# Patient Record
Sex: FEMALE | Race: WHITE | NOT HISPANIC OR LATINO | Employment: FULL TIME | URBAN - NONMETROPOLITAN AREA
[De-identification: names, ages, dates, MRNs, and addresses within clinical notes are randomized per-mention and may not be internally consistent; named-entity substitution may affect disease eponyms.]

---

## 2017-10-10 ENCOUNTER — TRANSCRIBE ORDERS (OUTPATIENT)
Dept: ADMINISTRATIVE | Facility: HOSPITAL | Age: 47
End: 2017-10-10

## 2017-10-10 DIAGNOSIS — Z12.39 SCREENING BREAST EXAMINATION: ICD-10-CM

## 2017-10-10 DIAGNOSIS — Z12.31 ENCOUNTER FOR SCREENING MAMMOGRAM FOR MALIGNANT NEOPLASM OF BREAST: Primary | ICD-10-CM

## 2017-10-18 ENCOUNTER — HOSPITAL ENCOUNTER (OUTPATIENT)
Dept: MAMMOGRAPHY | Facility: HOSPITAL | Age: 47
Discharge: HOME | End: 2017-10-18
Admitting: NURSE PRACTITIONER

## 2017-10-18 DIAGNOSIS — Z12.31 ENCOUNTER FOR SCREENING MAMMOGRAM FOR MALIGNANT NEOPLASM OF BREAST: ICD-10-CM

## 2017-10-18 PROCEDURE — 77063 BREAST TOMOSYNTHESIS BI: CPT

## 2017-10-18 PROCEDURE — G0202 SCR MAMMO BI INCL CAD: HCPCS

## 2019-01-11 ENCOUNTER — TELEPHONE (OUTPATIENT)
Dept: BARIATRICS/WEIGHT MGMT | Facility: CLINIC | Age: 49
End: 2019-01-11

## 2019-01-11 NOTE — TELEPHONE ENCOUNTER
Patient has not been seen by Dr. Avina. She had her Lap-Band operative report faxed over to us yesterday 01/10/2019. She said that she has had food stuck in the pouch since 01/09/2018 at 6 PM and called to see if Dr. Avina has reviewed her report yet so that an appointment could be made. I told her that he has not and that it would be at least Monday before he did. She asked for recommendations on how to remove the food and that she is diabetic. I informed her that since she has never been seen, we cannot give her any recommendations. I did tell her though to contact her PCP or go to the ER in the meantime. Patient voiced understanding.

## 2019-01-14 ENCOUNTER — HOSPITAL ENCOUNTER (OUTPATIENT)
Dept: GENERAL RADIOLOGY | Facility: HOSPITAL | Age: 49
Discharge: HOME | End: 2019-01-14
Attending: SURGERY | Admitting: SURGERY

## 2019-01-14 ENCOUNTER — OFFICE VISIT (OUTPATIENT)
Dept: BARIATRICS/WEIGHT MGMT | Facility: CLINIC | Age: 49
End: 2019-01-14

## 2019-01-14 VITALS
WEIGHT: 226 LBS | HEART RATE: 90 BPM | TEMPERATURE: 98.3 F | BODY MASS INDEX: 40.04 KG/M2 | OXYGEN SATURATION: 98 % | SYSTOLIC BLOOD PRESSURE: 141 MMHG | DIASTOLIC BLOOD PRESSURE: 97 MMHG | HEIGHT: 63 IN

## 2019-01-14 DIAGNOSIS — E66.01 OBESITY, CLASS III, BMI 40-49.9 (MORBID OBESITY) (HCC): ICD-10-CM

## 2019-01-14 DIAGNOSIS — Z98.84 H/O LAPAROSCOPIC ADJUSTABLE GASTRIC BANDING: ICD-10-CM

## 2019-01-14 DIAGNOSIS — R13.19 OTHER DYSPHAGIA: Primary | ICD-10-CM

## 2019-01-14 PROCEDURE — S2083 ADJUSTMENT GASTRIC BAND: HCPCS | Performed by: SURGERY

## 2019-01-14 PROCEDURE — 74220 X-RAY XM ESOPHAGUS 1CNTRST: CPT

## 2019-01-14 PROCEDURE — 99203 OFFICE O/P NEW LOW 30 MIN: CPT | Performed by: SURGERY

## 2019-01-14 RX ORDER — ALPRAZOLAM 0.5 MG/1
TABLET ORAL DAILY
Refills: 2 | COMMUNITY
Start: 2019-01-08

## 2019-01-14 RX ORDER — DULOXETIN HYDROCHLORIDE 60 MG/1
60 CAPSULE, DELAYED RELEASE ORAL DAILY
Refills: 5 | COMMUNITY
Start: 2019-01-10

## 2019-01-14 RX ORDER — MULTIVIT-MIN/IRON/FOLIC ACID/K 18-600-40
CAPSULE ORAL WEEKLY
COMMUNITY

## 2019-01-14 RX ORDER — LEVOTHYROXINE SODIUM 0.05 MG/1
50 TABLET ORAL DAILY
Refills: 5 | COMMUNITY
Start: 2019-01-10

## 2019-01-14 RX ORDER — INSULIN DEGLUDEC 200 U/ML
50 INJECTION, SOLUTION SUBCUTANEOUS DAILY
COMMUNITY
Start: 2019-01-14

## 2019-01-14 RX ADMIN — BARIUM SULFATE 150 ML: 960 POWDER, FOR SUSPENSION ORAL at 15:05

## 2019-01-14 NOTE — PROGRESS NOTES
Patient Care Team:  Shubham Ballesteros MD as PCP - General (Family Medicine)    Reason for Visit: Hx. of Lap Gastric Band     Subjective     Patient is a 48 y.o. female presents with morbid obesity and her Body mass index is 40.68 kg/m².     She stated on this past Wednesday she ate roast beef and since then she has had difficulty swallowing foods.  She stated she is able to tolerate liquids however anything denser than puréed foods she had difficulty swallowing or regurgitated.  She stated she has been with the disease of obesity for year(s).  She stated she suffers from diabetes and joint pain due to her weight gain.  She stated that weight loss helps alleviate these symptoms.  With regards to her dysphagia there are no alleviating factors with regards to her sensation.  She stated that she had this problem in the past which was resolved by removing fluid from her band.  She stated has not had an adjustment for over 5 years nor has she been with a program since her previous program had shut down in [place].      Review of Systems  General ROS: negative  Respiratory ROS: no cough, shortness of breath, or wheezing  Cardiovascular ROS: no chest pain or dyspnea on exertion  Gastrointestinal ROS: no abdominal pain, change in bowel habits, or black or bloody stools    History  Past Medical History:   Diagnosis Date   • Anxiety    • Diabetes mellitus (CMS/HCC)    • Fibromyalgia    • Hypothyroidism     Dr Ballesteros    • Joint pain    • Migraines      Past Surgical History:   Procedure Laterality Date   • BREAST BIOPSY      Dr Anne    • CHOLECYSTECTOMY      Lap Dr Modi    • LAPAROSCOPIC GASTRIC BANDING  03/26/2010    Dr Rushing at [facility]      Family History   Problem Relation Age of Onset   • Arthritis Mother    • Dementia Mother    • Cancer Father         Skin    • No Known Problems Sister    • No Known Problems Brother    • No Known Problems Daughter    • No Known Problems Son    • No Known  Problems Maternal Grandmother    • No Known Problems Paternal Grandmother    • No Known Problems Maternal Aunt    • No Known Problems Paternal Aunt    • BRCA 1/2 Neg Hx    • Breast cancer Neg Hx    • Colon cancer Neg Hx    • Endometrial cancer Neg Hx    • Ovarian cancer Neg Hx      Social History     Tobacco Use   • Smoking status: Never Smoker   Substance Use Topics   • Alcohol use: Yes     Comment: rarely 1-2 times monthly   • Drug use: No       (Not in a hospital admission)  Allergies:  Diflucan [fluconazole]      Current Outpatient Medications:   •  ALPRAZolam (XANAX) 0.5 MG tablet, Take  by mouth Daily., Disp: , Rfl: 2  •  Cholecalciferol (VITAMIN D) 2000 units capsule, Take  by mouth 1 (One) Time Per Week., Disp: , Rfl:   •  DULoxetine (CYMBALTA) 60 MG capsule, Take 60 mg by mouth Daily., Disp: , Rfl: 5  •  KRILL OIL PO, Take  by mouth Daily., Disp: , Rfl:   •  levothyroxine (SYNTHROID, LEVOTHROID) 50 MCG tablet, Take 50 mcg by mouth Daily., Disp: , Rfl: 5  •  metFORMIN (GLUCOPHAGE) 1000 MG tablet, Take 2,000 mg by mouth Every Night., Disp: , Rfl: 5  •  NON FORMULARY, Lemon Bark 750mg two tablets at night, Disp: , Rfl:   •  TRESIBA FLEXTOUCH 200 UNIT/ML solution pen-injector, 50 Units Daily., Disp: , Rfl:     Objective     Vital Signs  Temp:  [98.3 °F (36.8 °C)] 98.3 °F (36.8 °C)  Heart Rate:  [90] 90  BP: (141)/(97) 141/97  Body mass index is 40.68 kg/m².      01/14/19  1331   Weight: 103 kg (226 lb)       Physical Exam:      HEENT: extra ocular movement intact  Respiratory: appears well, vitals normal, no respiratory distress, acyanotic, normal RR  GI: Abnormal shape: obese  Masses: Location- LUQ not tender 4 cm  Tenderness: absent  Musculoskeletal: inspection - no abnormality  Neurologic: alert, oriented, normal speech, no focal findings or movement disorder noted       Results Review:   Patient was ordered an esophagogram to assess the position of her band and patency of her band        Assessment/Plan    Encounter Diagnoses   Name Primary?   • Other dysphagia Yes   • H/O laparoscopic adjustable gastric banding    • Obesity, Class III, BMI 40-49.9 (morbid obesity) (CMS/[facility])        A total of 30 minutes was spent face-to-face with this patient during this encounter and over half the time was spent on counseling and coordination of care of her disease of obesity and recent onset of dysphagia.  With regards to her dysphagia I spoke with the patient and explained the adjustment process.  I explained the alternatives, benefits, complications and risks associated with adjusting her band which included the risk of infection, injuring the port, bleeding or leakage.  Verbal consent was obtained and I proceeded with accessing her port in standard fashion.  The skin was prepped with alcohol and a Marek needle was used to access her port.  A total of 4 mL was removed from her band without difficulty.  The patient subsequently drank 8 ounces of water without difficulty.  I followed this with a quarter of an esophagogram to assess the position of her band.  Of note I continued with educating the patient about appropriate behavior modifications to help assist the patient with continued weight loss even after removal of her fluid from her band.  The patient is to follow-up with our program in 4 weeks or as needed.  I discussed the patient's findings and my recommendations with patient.     Dr. Justin Avina MD FACS    01/14/19  2:01 PM  Patient Care Team:  Shubham Ballesteros MD as PCP - General (Family Medicine)

## 2019-01-15 ENCOUNTER — TELEPHONE (OUTPATIENT)
Dept: BARIATRICS/WEIGHT MGMT | Facility: CLINIC | Age: 49
End: 2019-01-15

## 2019-01-23 ENCOUNTER — HOSPITAL ENCOUNTER (OUTPATIENT)
Dept: WOMENS IMAGING | Age: 49
Discharge: HOME | End: 2019-01-23
Payer: COMMERCIAL

## 2019-01-23 DIAGNOSIS — Z12.31 ENCOUNTER FOR SCREENING MAMMOGRAM FOR MALIGNANT NEOPLASM OF BREAST: ICD-10-CM

## 2019-01-23 PROCEDURE — 77063 BREAST TOMOSYNTHESIS BI: CPT

## 2019-05-14 ENCOUNTER — TELEPHONE (OUTPATIENT)
Dept: BARIATRICS/WEIGHT MGMT | Facility: CLINIC | Age: 49
End: 2019-05-14

## 2019-05-14 NOTE — TELEPHONE ENCOUNTER
Called to reschedule patient's appointment that was cancelled in 03/2019. Patient said she does not wish to reschedule.

## 2019-10-09 ENCOUNTER — OFFICE VISIT (OUTPATIENT)
Dept: URGENT CARE | Age: 49
End: 2019-10-09
Payer: COMMERCIAL

## 2019-10-09 VITALS
RESPIRATION RATE: 16 BRPM | SYSTOLIC BLOOD PRESSURE: 138 MMHG | BODY MASS INDEX: 34.99 KG/M2 | HEART RATE: 112 BPM | DIASTOLIC BLOOD PRESSURE: 84 MMHG | WEIGHT: 210 LBS | OXYGEN SATURATION: 98 % | TEMPERATURE: 97.8 F | HEIGHT: 65 IN

## 2019-10-09 DIAGNOSIS — R11.0 NAUSEA: ICD-10-CM

## 2019-10-09 DIAGNOSIS — R74.8 ELEVATED SERUM ALKALINE PHOSPHATASE LEVEL: ICD-10-CM

## 2019-10-09 DIAGNOSIS — R51.9 ACUTE NONINTRACTABLE HEADACHE, UNSPECIFIED HEADACHE TYPE: Primary | ICD-10-CM

## 2019-10-09 DIAGNOSIS — B37.2 CANDIDIASIS OF SKIN: ICD-10-CM

## 2019-10-09 LAB
ALBUMIN SERPL-MCNC: 4.7 G/DL (ref 3.5–5.2)
ALP BLD-CCNC: 172 U/L (ref 35–104)
ALT SERPL-CCNC: 14 U/L (ref 5–33)
ANION GAP SERPL CALCULATED.3IONS-SCNC: 19 MMOL/L (ref 7–19)
AST SERPL-CCNC: 12 U/L (ref 5–32)
BASOPHILS ABSOLUTE: 0.1 K/UL (ref 0–0.2)
BASOPHILS RELATIVE PERCENT: 1 % (ref 0–1)
BILIRUB SERPL-MCNC: 0.4 MG/DL (ref 0.2–1.2)
BUN BLDV-MCNC: 9 MG/DL (ref 6–20)
CALCIUM SERPL-MCNC: 10 MG/DL (ref 8.6–10)
CHLORIDE BLD-SCNC: 101 MMOL/L (ref 98–111)
CO2: 17 MMOL/L (ref 22–29)
CREAT SERPL-MCNC: 0.8 MG/DL (ref 0.5–0.9)
EOSINOPHILS ABSOLUTE: 0.7 K/UL (ref 0–0.6)
EOSINOPHILS RELATIVE PERCENT: 6.9 % (ref 0–5)
GFR NON-AFRICAN AMERICAN: >60
GLUCOSE BLD-MCNC: 261 MG/DL (ref 74–109)
HCT VFR BLD CALC: 52.1 % (ref 37–47)
HEMOGLOBIN: 17.5 G/DL (ref 12–16)
IMMATURE GRANULOCYTES #: 0 K/UL
LYMPHOCYTES ABSOLUTE: 2.9 K/UL (ref 1.1–4.5)
LYMPHOCYTES RELATIVE PERCENT: 29.3 % (ref 20–40)
MCH RBC QN AUTO: 27.3 PG (ref 27–31)
MCHC RBC AUTO-ENTMCNC: 33.6 G/DL (ref 33–37)
MCV RBC AUTO: 81.2 FL (ref 81–99)
MONOCYTES ABSOLUTE: 0.7 K/UL (ref 0–0.9)
MONOCYTES RELATIVE PERCENT: 7.5 % (ref 0–10)
NEUTROPHILS ABSOLUTE: 5.5 K/UL (ref 1.5–7.5)
NEUTROPHILS RELATIVE PERCENT: 55 % (ref 50–65)
PDW BLD-RTO: 17 % (ref 11.5–14.5)
PLATELET # BLD: 347 K/UL (ref 130–400)
PMV BLD AUTO: 9.8 FL (ref 9.4–12.3)
POTASSIUM SERPL-SCNC: 4 MMOL/L (ref 3.5–5)
RBC # BLD: 6.42 M/UL (ref 4.2–5.4)
SODIUM BLD-SCNC: 137 MMOL/L (ref 136–145)
TOTAL PROTEIN: 7.2 G/DL (ref 6.6–8.7)
WBC # BLD: 9.9 K/UL (ref 4.8–10.8)

## 2019-10-09 PROCEDURE — 36415 COLL VENOUS BLD VENIPUNCTURE: CPT | Performed by: NURSE PRACTITIONER

## 2019-10-09 PROCEDURE — 99203 OFFICE O/P NEW LOW 30 MIN: CPT | Performed by: NURSE PRACTITIONER

## 2019-10-09 RX ORDER — NYSTATIN 100000 [USP'U]/G
POWDER TOPICAL
Qty: 1 BOTTLE | Refills: 0 | Status: SHIPPED | OUTPATIENT
Start: 2019-10-09 | End: 2021-08-05

## 2019-10-09 ASSESSMENT — ENCOUNTER SYMPTOMS
SORE THROAT: 0
SHORTNESS OF BREATH: 0
SINUS PAIN: 0
BACK PAIN: 0
DIARRHEA: 0
TROUBLE SWALLOWING: 0
COUGH: 0
ABDOMINAL PAIN: 1
EYES NEGATIVE: 1
NAUSEA: 1
SINUS PRESSURE: 0
VOMITING: 0
ALLERGIC/IMMUNOLOGIC NEGATIVE: 1
CONSTIPATION: 1

## 2020-07-27 ENCOUNTER — OFFICE VISIT (OUTPATIENT)
Dept: OBGYN | Age: 50
End: 2020-07-27
Payer: COMMERCIAL

## 2020-07-27 VITALS
DIASTOLIC BLOOD PRESSURE: 94 MMHG | WEIGHT: 194 LBS | HEIGHT: 65 IN | HEART RATE: 84 BPM | SYSTOLIC BLOOD PRESSURE: 127 MMHG | BODY MASS INDEX: 32.32 KG/M2

## 2020-07-27 PROCEDURE — 99203 OFFICE O/P NEW LOW 30 MIN: CPT | Performed by: NURSE PRACTITIONER

## 2020-07-27 RX ORDER — ATORVASTATIN CALCIUM 10 MG/1
10 TABLET, FILM COATED ORAL DAILY
COMMUNITY

## 2020-07-27 RX ORDER — LEVOTHYROXINE SODIUM 0.05 MG/1
50 TABLET ORAL DAILY
COMMUNITY

## 2020-07-27 ASSESSMENT — ENCOUNTER SYMPTOMS
ALLERGIC/IMMUNOLOGIC NEGATIVE: 1
BLOOD IN STOOL: 0
RESPIRATORY NEGATIVE: 1
CONSTIPATION: 0
DIARRHEA: 0
ABDOMINAL DISTENTION: 1
EYES NEGATIVE: 1

## 2020-07-27 NOTE — PROGRESS NOTES
Gustavo Alvarez is a 44 y.o. female who presents today for her medical conditions/ complaints as noted below. Gustavo Alvarez is c/o of Establish Care and Pelvic Pain        HPI  New pt presents with new onset pelvic pain and yeast infection for about a month. Was seen by her PCP and treated for yeast- it did not improve. Had transabdominal u/s showing normal findings. Pt has not had a period in 5 years. Had uterine ablation. Still having some hot flashes. Pt has type 2 DM. Takes Jardiance and Metformin. Gets frequent yeast infections. Also having more gas, bloating, foul smelling stools. Has never had colon cancer screening. Patient's last menstrual period was 10/31/2014. No obstetric history on file. Past Medical History:   Diagnosis Date    Anxiety     Fibromyalgia     Hyperlipidemia     Thyroid disease     Type II or unspecified type diabetes mellitus without mention of complication, not stated as uncontrolled      Past Surgical History:   Procedure Laterality Date    BREAST BIOPSY  4/19/2012    stereotactic right, fat necrosis and foci of fibrosis    CHOLECYSTECTOMY      DILATION AND CURETTAGE OF UTERUS      LAP BAND  2009    OTHER SURGICAL HISTORY      uterine ablation     Family History   Problem Relation Age of Onset    Heart Disease Father      Social History     Tobacco Use    Smoking status: Never Smoker    Smokeless tobacco: Never Used   Substance Use Topics    Alcohol use: Yes       Current Outpatient Medications   Medication Sig Dispense Refill    levothyroxine (SYNTHROID) 50 MCG tablet Take 50 mcg by mouth Daily      atorvastatin (LIPITOR) 10 MG tablet Take 10 mg by mouth daily      nystatin-triamcinolone (MYCOLOG II) 944160-0.1 UNIT/GM-% cream Apply topically 2 times daily.  1 Tube 1    empagliflozin (JARDIANCE) 25 MG tablet Take 25 mg by mouth daily      Cholecalciferol (VITAMIN D PO) Take by mouth      nystatin (MYCOSTATIN) 950302 UNIT/GM powder Apply small amount under breasts 3 times daily for 10 days 1 Bottle 0    CRESTOR 20 MG tablet 20 mg.      Eszopiclone (LUNESTA PO) Take  by mouth.  duloxetine (CYMBALTA) 60 MG capsule Take 60 mg by mouth daily.  metformin (GLUCOPHAGE) 1000 MG tablet Take 1,000 mg by mouth 2 times daily (with meals).  alprazolam (XANAX) 0.5 MG tablet Take 0.5 mg by mouth nightly as needed.  cyclobenzaprine (FLEXERIL) 10 MG tablet Take 10 mg by mouth 3 times daily as needed. No current facility-administered medications for this visit. Allergies   Allergen Reactions    Diflucan [Fluconazole]      Vitals:    07/27/20 0856   BP: (!) 127/94   Pulse: 84     Body mass index is 32.28 kg/m². Review of Systems   Constitutional: Negative. HENT: Negative. Eyes: Negative. Respiratory: Negative. Cardiovascular: Negative. Gastrointestinal: Positive for abdominal distention. Negative for blood in stool, constipation and diarrhea. Endocrine: Negative. Genitourinary: Positive for pelvic pain and vaginal pain (burning). Negative for frequency, menstrual problem and urgency. Musculoskeletal: Negative. Skin: Negative. Allergic/Immunologic: Negative. Neurological: Negative. Hematological: Negative. Psychiatric/Behavioral: Negative. All other systems reviewed and are negative. Physical Exam  Vitals signs and nursing note reviewed. Constitutional:       Appearance: She is well-developed. HENT:      Head: Normocephalic. Right Ear: External ear normal.      Left Ear: External ear normal.      Nose: Nose normal.   Neck:      Musculoskeletal: Normal range of motion. Genitourinary:     Labia:         Right: Tenderness present. Left: Tenderness present. Vagina: Erythema and tenderness present. No vaginal discharge or bleeding. Cervix: No cervical motion tenderness or discharge. Uterus: Tender. Adnexa:         Right: Tenderness and fullness present.  No mass.          Left: Tenderness and fullness present. No mass. Comments: Erythematic vulva without lesions  Musculoskeletal: Normal range of motion. Skin:     General: Skin is warm and dry. Neurological:      Mental Status: She is alert and oriented to person, place, and time. Psychiatric:         Attention and Perception: Attention normal.         Mood and Affect: Mood normal.         Speech: Speech normal.         Behavior: Behavior normal.         Thought Content: Thought content normal.         Cognition and Memory: Cognition normal.         Judgment: Judgment normal.          Diagnosis Orders   1. Encounter to establish care     2. Pelvic pain     3. Vulvar itching     4. Screen for colon cancer  Stacia Teran MD, Gastroenterology, [facility]   5. Bloating         MEDICATIONS:  Orders Placed This Encounter   Medications    DISCONTD: nystatin-triamcinolone (MYCOLOG II) 867718-6.9 UNIT/GM-% cream     Sig: Apply topically 2 times daily. Dispense:  1 Tube     Refill:  1    nystatin-triamcinolone (MYCOLOG II) 997818-5.0 UNIT/GM-% cream     Sig: Apply topically 2 times daily. Dispense:  1 Tube     Refill:  1       ORDERS:  Orders Placed This Encounter   Procedures   Stacia Teran MD, Gastroenterology, [place]       PLAN:  Diatherix collected  Treatment given for external yeast on exam  Referral made to GI    Patient Instructions   Patient Education        Pelvic Pain: Care Instructions  Your Care Instructions     Pelvic pain, or pain in the lower belly, can have many causes. Often pelvic pain is not serious and gets better in a few days. If your pain continues or gets worse, you may need tests and treatment. Tell your doctor about any new symptoms. These may be signs of a serious problem. Follow-up care is a key part of your treatment and safety. Be sure to make and go to all appointments, and call your doctor if you are having problems.  It's also a good idea to know your test results and keep a list of the medicines you take. How can you care for yourself at home? · Rest until you feel better. Lie down, and raise your legs by placing a pillow under your knees. · Drink plenty of fluids. You may find that small, frequent sips are easier on your stomach than if you drink a lot at once. Avoid drinks with carbonation or caffeine, such as soda pop, tea, or coffee. · Try eating several small meals instead of 2 or 3 large ones. Eat mild foods, such as rice, dry toast or crackers, bananas, and applesauce. Avoid fatty and spicy foods, other fruits, and alcohol until 48 hours after your symptoms have gone away. · Take an over-the-counter pain medicine, such as acetaminophen (Tylenol), ibuprofen (Advil, Motrin), or naproxen (Aleve). Read and follow all instructions on the label. · Do not take two or more pain medicines at the same time unless the doctor told you to. Many pain medicines have acetaminophen, which is Tylenol. Too much acetaminophen (Tylenol) can be harmful. · You can put a heating pad, a warm cloth, or moist heat on your belly to relieve pain. When should you call for help? Call your doctor now or seek immediate medical care if:  · You have a new or higher fever. · You have unusual vaginal bleeding. · You have new or worse belly or pelvic pain. · You have vaginal discharge that has increased in amount or smells bad. Watch closely for changes in your health, and be sure to contact your doctor if:  · You do not get better as expected. Where can you learn more? Go to https://[employer] Glass.vBrand. org and sign in to your [employer] account. Enter [phone] in the [employer] box to learn more about \"Pelvic Pain: Care Instructions. \"     If you do not have an account, please click on the \"Sign Up Now\" link. Current as of: November 8, 2019               Content Version: 12.5  © 0820-8340 Healthwise, Incorporated.    Care instructions adapted under license by [facility] [place] [place] [address] [facility] If you have questions about a medical condition or this instruction, always ask your healthcare professional. [place] [address] any warranty or liability for your use of this information. Patient Education        Vaginal Yeast Infection: Care Instructions  Your Care Instructions     A vaginal yeast infection is caused by too many yeast cells in the vagina. This is common in women of all ages. Itching, vaginal discharge and irritation, and other symptoms can bother you. But yeast infections don't often cause other health problems. Some medicines can increase your risk of getting a yeast infection. These include antibiotics, birth control pills, hormones, and steroids. You may also be more likely to get a yeast infection if you are pregnant, have diabetes, douche, or wear tight clothes. With treatment, most yeast infections get better in 2 to 3 days. Follow-up care is a key part of your treatment and safety. Be sure to make and go to all appointments, and call your doctor if you are having problems. It's also a good idea to know your test results and keep a list of the medicines you take. How can you care for yourself at home? · Take your medicines exactly as prescribed. Call your doctor if you think you are having a problem with your medicine. · Ask your doctor about over-the-counter (OTC) medicines for yeast infections. They may cost less than prescription medicines. If you use an OTC treatment, read and follow all instructions on the label. · Do not use tampons while using a vaginal cream or suppository. The tampons can absorb the medicine. Use pads instead. · Wear loose cotton clothing. Do not wear nylon or other fabric that holds body heat and moisture close to the skin. · Try sleeping without underwear. · Do not scratch. Relieve itching with a cold pack or a cool bath. · Do not wash your vaginal area more than once a day.  Use plain water or a mild, unscented soap. Air-dry the vaginal area. · Change out of wet swimsuits after swimming. · Do not have sex until you have finished your treatment. · Do not douche. When should you call for help? Call your doctor now or seek immediate medical care if:  · You have unexpected vaginal bleeding. · You have new or increased pain in your vagina or pelvis. Watch closely for changes in your health, and be sure to contact your doctor if:  · You have a fever. · You are not getting better after 2 days. · Your symptoms come back after you finish your medicines. Where can you learn more? Go to https://Focus Financial Partnerspepiceweb.healthTORCH.sh. org and sign in to your [employer] account. Enter Y215 in the Ferfics box to learn more about \"Vaginal Yeast Infection: Care Instructions. \"     If you do not have an account, please click on the \"Sign Up Now\" link. Current as of: November 8, 2019               Content Version: 12.5  © 5628-7841 Healthwise, Incorporated. Care instructions adapted under license by [facility] ([facility]). If you have questions about a medical condition or this instruction, always ask your healthcare professional. [address] any warranty or liability for your use of this information.

## 2020-07-27 NOTE — PROGRESS NOTES
Pt is referred for pelvic pain. She saw her PCP on 7/9 for her annual pap smear and breast exam. Was c/o the pelvic pain and was treated for yeast infection. She states the pain is intermittent and it started in the beginning of this month. She did have a pelvic u/s. She is also having painful intercourse and bleeding with intercourse since the beginning of this month as well.

## 2020-07-27 NOTE — PATIENT INSTRUCTIONS
Patient Education        Pelvic Pain: Care Instructions  Your Care Instructions     Pelvic pain, or pain in the lower belly, can have many causes. Often pelvic pain is not serious and gets better in a few days. If your pain continues or gets worse, you may need tests and treatment. Tell your doctor about any new symptoms. These may be signs of a serious problem. Follow-up care is a key part of your treatment and safety. Be sure to make and go to all appointments, and call your doctor if you are having problems. It's also a good idea to know your test results and keep a list of the medicines you take. How can you care for yourself at home? · Rest until you feel better. Lie down, and raise your legs by placing a pillow under your knees. · Drink plenty of fluids. You may find that small, frequent sips are easier on your stomach than if you drink a lot at once. Avoid drinks with carbonation or caffeine, such as soda pop, tea, or coffee. · Try eating several small meals instead of 2 or 3 large ones. Eat mild foods, such as rice, dry toast or crackers, bananas, and applesauce. Avoid fatty and spicy foods, other fruits, and alcohol until 48 hours after your symptoms have gone away. · Take an over-the-counter pain medicine, such as acetaminophen (Tylenol), ibuprofen (Advil, Motrin), or naproxen (Aleve). Read and follow all instructions on the label. · Do not take two or more pain medicines at the same time unless the doctor told you to. Many pain medicines have acetaminophen, which is Tylenol. Too much acetaminophen (Tylenol) can be harmful. · You can put a heating pad, a warm cloth, or moist heat on your belly to relieve pain. When should you call for help? Call your doctor now or seek immediate medical care if:  · You have a new or higher fever. · You have unusual vaginal bleeding. · You have new or worse belly or pelvic pain. · You have vaginal discharge that has increased in amount or smells bad.   Watch closely for changes in your health, and be sure to contact your doctor if:  · You do not get better as expected. Where can you learn more? Go to https://chpepiceweb.healthAdvizzer. org and sign in to your LifeOnKey account. Enter [phone] in the [facility] box to learn more about \"Pelvic Pain: Care Instructions. \"     If you do not have an account, please click on the \"Sign Up Now\" link. Current as of: November 8, 2019               Content Version: 12.5  © 8446-7082 [employer]. Care instructions adapted under license by [facility] ([facility]). If you have questions about a medical condition or this instruction, always ask your healthcare professional. [address] any warranty or liability for your use of this information. Patient Education        Vaginal Yeast Infection: Care Instructions  Your Care Instructions     A vaginal yeast infection is caused by too many yeast cells in the vagina. This is common in women of all ages. Itching, vaginal discharge and irritation, and other symptoms can bother you. But yeast infections don't often cause other health problems. Some medicines can increase your risk of getting a yeast infection. These include antibiotics, birth control pills, hormones, and steroids. You may also be more likely to get a yeast infection if you are pregnant, have diabetes, douche, or wear tight clothes. With treatment, most yeast infections get better in 2 to 3 days. Follow-up care is a key part of your treatment and safety. Be sure to make and go to all appointments, and call your doctor if you are having problems. It's also a good idea to know your test results and keep a list of the medicines you take. How can you care for yourself at home? · Take your medicines exactly as prescribed. Call your doctor if you think you are having a problem with your medicine. · Ask your doctor about over-the-counter (OTC) medicines for yeast infections.  They may

## 2020-07-29 ENCOUNTER — OFFICE VISIT (OUTPATIENT)
Dept: GASTROENTEROLOGY | Age: 50
End: 2020-07-29
Payer: COMMERCIAL

## 2020-07-29 VITALS
OXYGEN SATURATION: 99 % | HEIGHT: 65 IN | HEART RATE: 89 BPM | SYSTOLIC BLOOD PRESSURE: 130 MMHG | BODY MASS INDEX: 31.65 KG/M2 | WEIGHT: 190 LBS | DIASTOLIC BLOOD PRESSURE: 80 MMHG

## 2020-07-29 PROCEDURE — 99214 OFFICE O/P EST MOD 30 MIN: CPT | Performed by: NURSE PRACTITIONER

## 2020-07-29 ASSESSMENT — ENCOUNTER SYMPTOMS
DIARRHEA: 0
ABDOMINAL PAIN: 1
BLOOD IN STOOL: 0
CHOKING: 0
CONSTIPATION: 1
COUGH: 0
NAUSEA: 0
TROUBLE SWALLOWING: 0
ANAL BLEEDING: 0
ABDOMINAL DISTENTION: 1
VOMITING: 0
SHORTNESS OF BREATH: 0
RECTAL PAIN: 0

## 2020-07-29 NOTE — PROGRESS NOTES
Subjective:     Patient ID: Adam Doll is a 44 y.o. female  PCP: Marlon Vila  Referring Provider: NAYELY Ramirez CNP    HPI  Patient presents to the office today with the following complaints: New Patient (ref by Lily Cabello); Abdominal Pain; Colonoscopy; and Bloated    Abdominal Pain  Patient complains of abdominal pain. The pain is located in the lower abdomen. The pain is described as sharp. Onset was one month ago. Aggravating factors include nothing. Alleviating factors include none. Associated symptoms include constipation. The patient denies diarrhea, hematochezia and melena. Pt reports chronic constipation. She is currently using Benefiber with little results. She has tried and failed Miralax. No previous history of colonoscopy  Denies any family history of colon cancer or colon polyps    This was my first time assessing Ms. Shankar    Assessment:     1. Lower abdominal pain    2. Constipation, unspecified constipation type    3. Bloating         Plan:   - Trial of Linzess, samples provided today  - Schedule colonoscopy  Instruct on bowel prep. Nothing to eat or drink after midnight the day of the exam.  Unable to drive for 24 hours after the procedure. No aspirin or nonsteroidal anti-inflammatories for 5 days before procedure. I have discussed the benefits, alternatives, and risks (including bleeding, perforation and death)  for pursuing Endoscopy (EGD/Colonscopy/EUS/ERCP) with the patient and they are willing to continue. We also discussed the need for anesthesia, IV access, proper dietary changes, medication changes if necessary, and need for bowel prep (if ordered) prior to their Endoscopic procedure. They are aware they must have someone accompany them to their scheduled procedure to drive them home - they agree to the above and are willing to continue. Orders  No orders of the defined types were placed in this encounter.     Medications  No orders of the defined types were placed in this encounter.         Patient History:     Past Medical History:   Diagnosis Date    Anxiety     Fibromyalgia     Hyperlipidemia     Thyroid disease     Type II or unspecified type diabetes mellitus without mention of complication, not stated as uncontrolled        Past Surgical History:   Procedure Laterality Date    BREAST BIOPSY  4/19/2012    stereotactic right, fat necrosis and foci of fibrosis    CHOLECYSTECTOMY      DILATION AND CURETTAGE OF UTERUS      LAP BAND  2009    OTHER SURGICAL HISTORY      uterine ablation       Family History   Problem Relation Age of Onset    Heart Disease Father     Colon Cancer Neg Hx     Colon Polyps Neg Hx        Social History     Socioeconomic History    Marital status:      Spouse name: None    Number of children: None    Years of education: None    Highest education level: None   Occupational History    None   Social Needs    Financial resource strain: None    Food insecurity     Worry: None     Inability: None    Transportation needs     Medical: None     Non-medical: None   Tobacco Use    Smoking status: Never Smoker    Smokeless tobacco: Never Used   Substance and Sexual Activity    Alcohol use: Not Currently     Comment: occ    Drug use: No    Sexual activity: None   Lifestyle    Physical activity     Days per week: None     Minutes per session: None    Stress: None   Relationships    Social connections     Talks on phone: None     Gets together: None     Attends Hindu service: None     Active member of club or organization: None     Attends meetings of clubs or organizations: None     Relationship status: None    Intimate partner violence     Fear of current or ex partner: None     Emotionally abused: None     Physically abused: None     Forced sexual activity: None   Other Topics Concern    None   Social History Narrative    None       Current Outpatient Medications   Medication Sig Dispense Refill    Left Ear: External ear normal.      Nose: Nose normal.      Comments: Mask in place     Mouth/Throat:      Comments: Mask in place    Eyes:      Conjunctiva/sclera: Conjunctivae normal.      Pupils: Pupils are equal, round, and reactive to light. Neck:      Musculoskeletal: Normal range of motion and neck supple. Cardiovascular:      Rate and Rhythm: Normal rate and regular rhythm. Heart sounds: Normal heart sounds. No murmur. No friction rub. No gallop. Pulmonary:      Effort: Pulmonary effort is normal. No respiratory distress. Breath sounds: Normal breath sounds. Abdominal:      General: Bowel sounds are normal. There is no distension. Palpations: Abdomen is soft. There is no mass. Tenderness: There is generalized abdominal tenderness. There is no guarding or rebound. Musculoskeletal: Normal range of motion. Skin:     General: Skin is warm and dry. Findings: No rash. Nails: There is no clubbing. Neurological:      Mental Status: She is alert and oriented to person, place, and time. Gait: Gait normal.   Psychiatric:         Behavior: Behavior normal.         Thought Content:  Thought content normal.

## 2020-07-30 ENCOUNTER — TELEPHONE (OUTPATIENT)
Dept: OBGYN | Age: 50
End: 2020-07-30

## 2020-07-30 NOTE — TELEPHONE ENCOUNTER
Please let her know her vaginal cultures are negative. Her pain may be more GI related, so will need to keep her referral. Thanks BRISEYDA    Pt informed and she has a c-scope set up.  Shelia/VIKKI

## 2020-07-31 ENCOUNTER — OFFICE VISIT (OUTPATIENT)
Age: 50
End: 2020-07-31

## 2020-07-31 VITALS — TEMPERATURE: 96.6 F | OXYGEN SATURATION: 97 % | HEART RATE: 83 BPM

## 2020-08-03 LAB — SARS-COV-2, NAA: NOT DETECTED

## 2020-08-04 ENCOUNTER — ANESTHESIA EVENT (OUTPATIENT)
Dept: OPERATING ROOM | Age: 50
End: 2020-08-04

## 2020-08-04 ENCOUNTER — HOSPITAL ENCOUNTER (OUTPATIENT)
Age: 50
Setting detail: OUTPATIENT SURGERY
Discharge: HOME | End: 2020-08-04
Attending: INTERNAL MEDICINE | Admitting: INTERNAL MEDICINE
Payer: COMMERCIAL

## 2020-08-04 ENCOUNTER — APPOINTMENT (OUTPATIENT)
Dept: OPERATING ROOM | Age: 50
End: 2020-08-04

## 2020-08-04 ENCOUNTER — ANESTHESIA (OUTPATIENT)
Dept: OPERATING ROOM | Age: 50
End: 2020-08-04

## 2020-08-04 VITALS
RESPIRATION RATE: 18 BRPM | DIASTOLIC BLOOD PRESSURE: 60 MMHG | TEMPERATURE: 97.7 F | HEART RATE: 89 BPM | BODY MASS INDEX: 31.32 KG/M2 | OXYGEN SATURATION: 98 % | WEIGHT: 188 LBS | HEIGHT: 65 IN | SYSTOLIC BLOOD PRESSURE: 102 MMHG

## 2020-08-04 VITALS — OXYGEN SATURATION: 98 % | DIASTOLIC BLOOD PRESSURE: 64 MMHG | SYSTOLIC BLOOD PRESSURE: 101 MMHG

## 2020-08-04 PROCEDURE — G8918 PT W/O PREOP ORDER IV AB PRO: HCPCS

## 2020-08-04 PROCEDURE — 45378 DIAGNOSTIC COLONOSCOPY: CPT | Performed by: INTERNAL MEDICINE

## 2020-08-04 PROCEDURE — 45378 DIAGNOSTIC COLONOSCOPY: CPT

## 2020-08-04 PROCEDURE — G8907 PT DOC NO EVENTS ON DISCHARG: HCPCS

## 2020-08-04 RX ORDER — ONDANSETRON 2 MG/ML
4 INJECTION INTRAMUSCULAR; INTRAVENOUS
Status: DISCONTINUED | OUTPATIENT
Start: 2020-08-04 | End: 2020-08-04 | Stop reason: HOSPADM

## 2020-08-04 RX ORDER — PROMETHAZINE HYDROCHLORIDE 25 MG/ML
6.25 INJECTION, SOLUTION INTRAMUSCULAR; INTRAVENOUS
Status: DISCONTINUED | OUTPATIENT
Start: 2020-08-04 | End: 2020-08-04 | Stop reason: HOSPADM

## 2020-08-04 RX ORDER — PROPOFOL 10 MG/ML
INJECTION, EMULSION INTRAVENOUS PRN
Status: DISCONTINUED | OUTPATIENT
Start: 2020-08-04 | End: 2020-08-04 | Stop reason: SDUPTHER

## 2020-08-04 RX ORDER — SODIUM CHLORIDE 9 MG/ML
INJECTION, SOLUTION INTRAVENOUS CONTINUOUS
Status: DISCONTINUED | OUTPATIENT
Start: 2020-08-04 | End: 2020-08-04 | Stop reason: HOSPADM

## 2020-08-04 RX ORDER — LIDOCAINE HYDROCHLORIDE 10 MG/ML
INJECTION, SOLUTION INFILTRATION; PERINEURAL PRN
Status: DISCONTINUED | OUTPATIENT
Start: 2020-08-04 | End: 2020-08-04 | Stop reason: SDUPTHER

## 2020-08-04 RX ORDER — DIPHENHYDRAMINE HYDROCHLORIDE 50 MG/ML
12.5 INJECTION INTRAMUSCULAR; INTRAVENOUS
Status: DISCONTINUED | OUTPATIENT
Start: 2020-08-04 | End: 2020-08-04 | Stop reason: HOSPADM

## 2020-08-04 RX ORDER — SODIUM CHLORIDE, SODIUM LACTATE, POTASSIUM CHLORIDE, CALCIUM CHLORIDE 600; 310; 30; 20 MG/100ML; MG/100ML; MG/100ML; MG/100ML
INJECTION, SOLUTION INTRAVENOUS CONTINUOUS PRN
Status: DISCONTINUED | OUTPATIENT
Start: 2020-08-04 | End: 2020-08-04 | Stop reason: SDUPTHER

## 2020-08-04 RX ADMIN — PROPOFOL 450 MG: 10 INJECTION, EMULSION INTRAVENOUS at 11:20

## 2020-08-04 RX ADMIN — LIDOCAINE HYDROCHLORIDE 5 ML: 10 INJECTION, SOLUTION INFILTRATION; PERINEURAL at 11:19

## 2020-08-04 RX ADMIN — SODIUM CHLORIDE: 9 INJECTION, SOLUTION INTRAVENOUS at 09:59

## 2020-08-04 RX ADMIN — SODIUM CHLORIDE, SODIUM LACTATE, POTASSIUM CHLORIDE, CALCIUM CHLORIDE: 600; 310; 30; 20 INJECTION, SOLUTION INTRAVENOUS at 11:11

## 2020-08-04 NOTE — H&P
Patient Name: Evalene Homans  : 1970  MRN: [id number]  DATE: 20    Allergies: Allergies   Allergen Reactions    Diflucan [Fluconazole] Hives        ENDOSCOPY  History and Physical    Procedure:    [x] Diagnostic Colonoscopy       [] Screening Colonoscopy  [] EGD      [] ERCP      [] EUS       [] Other    [x] Previous office notes/History and Physical reviewed from the patients chart. Please see EMR for further details of HPI. I have examined the patient's status immediately prior to the procedure and:      Indications/HPI:  1. Lower abdominal pain    2. Constipation, unspecified constipation type    3. Bloating        []Abdominal Pain   []Cancer- GI/Lung     []Fhx of colon CA/polyps  []History of Polyps  []Barretts            []Melena  []Abnormal Imaging              []Dysphagia              []Persistent Pneumonia   []Anemia                            []Food Impaction        []History of Polyps  [] GI Bleed             []Pulmonary nodule/Mass   []Change in bowel habits []Heartburn/Reflux  []Rectal Bleed (BRBPR)  []Chest Pain - Non Cardiac []Heme (+) Stool []Ulcers  []Constipation  []Hemoptysis  []Varices  []Diarrhea  []Hypoxemia    []Nausea/Vomiting   []Screening   []Crohns/Colitis  []Other:     Anesthesia:   [x] MAC [] Moderate Sedation   [] General   [] None     ROS: 12 pt Review of Symptoms was negative unless mentioned above    Medications:   Prior to Admission medications    Medication Sig Start Date End Date Taking? Authorizing Provider   linaCLOtide [facility]) 72 MCG CAPS capsule Take 1 capsule by mouth every morning (before breakfast) 20  Yes NAYELY Chapman - NP   levothyroxine (SYNTHROID) 50 MCG tablet Take 50 mcg by mouth Daily   Yes Historical Provider, MD   atorvastatin (LIPITOR) 10 MG tablet Take 10 mg by mouth daily   Yes Historical Provider, MD   nystatin-triamcinolone (MYCOLOG II) 765077-2.1 UNIT/GM-% cream Apply topically 2 times daily.  20  Yes NAYELY Carrasco - CNP   empagliflozin (JARDIANCE) 25 MG tablet Take 25 mg by mouth daily   Yes Historical Provider, MD   Cholecalciferol (VITAMIN D PO) Take by mouth   Yes Historical Provider, MD   nystatin (MYCOSTATIN) 443791 UNIT/GM powder Apply small amount under breasts 3 times daily for 10 days 10/9/19  Yes NAYELY Menendez - CNP   Eszopiclone (LUNESTA PO) Take  by mouth. Yes Historical Provider, MD   duloxetine (CYMBALTA) 60 MG capsule Take 60 mg by mouth daily. Yes Historical Provider, MD   cyclobenzaprine (FLEXERIL) 10 MG tablet Take 10 mg by mouth 3 times daily as needed. Yes Historical Provider, MD   metformin (GLUCOPHAGE) 1000 MG tablet Take 1,000 mg by mouth 2 times daily (with meals). Yes Historical Provider, MD   alprazolam Katiana Frost) 0.5 MG tablet Take 0.5 mg by mouth nightly as needed.      Yes Historical Provider, MD       Past Medical History:  Past Medical History:   Diagnosis Date    Anxiety     Fibromyalgia     Hyperlipidemia     Thyroid disease     Type II or unspecified type diabetes mellitus without mention of complication, not stated as uncontrolled        Past Surgical History:  Past Surgical History:   Procedure Laterality Date    BREAST BIOPSY  4/19/2012    stereotactic right, fat necrosis and foci of fibrosis    CHOLECYSTECTOMY      DILATION AND CURETTAGE OF UTERUS      LAP BAND  2009    OTHER SURGICAL HISTORY      uterine ablation       Social History:  Social History     Tobacco Use    Smoking status: Never Smoker    Smokeless tobacco: Never Used   Substance Use Topics    Alcohol use: Not Currently     Comment: occ    Drug use: No       Vital Signs:   Vitals:    08/04/20 0954   BP: 133/82   Pulse: 79   Resp: 18   Temp: 97.7 °F (36.5 °C)   SpO2: 100%        Physical Exam:  Cardiac:  [x]WNL  []Comments:  Pulmonary:  [x]WNL   []Comments:  Neuro/Mental Status:  [x]WNL  []Comments:  Abdominal:  [x]WNL    []Comments:  Other:   []WNL  []Comments:    Informed Consent:  The risks and benefits of the procedure have been discussed with either the patient or if they cannot consent, their representative. Assessment:  Patient examined and appropriate for planned sedation and procedure. Plan:  Proceed with planned sedation and procedure as above.          Jimmy Orellana MD

## 2020-08-04 NOTE — ANESTHESIA PRE PROCEDURE
Department of Anesthesiology  Preprocedure Note       Name:  Markos Hroton   Age:  44 y.o.  :  1970                                          MRN:  [id number]         Date:  2020      Surgeon: Naeem Escobar):  Jesús Roman MD    Procedure: Procedure(s):  COLONOSCOPY DIAGNOSTIC    Medications prior to admission:   Prior to Admission medications    Medication Sig Start Date End Date Taking? Authorizing Provider   [facility]inaCL[facility]ide [facility]) 72 MCG CAPS capsule Take 1 capsule by mouth every morning (before breakfast) 20  Yes Irwin Kang APRN - NP   levothyroxine (SYNTHROID) 50 MCG tablet Take 50 mcg by mouth Daily   Yes Historical Provider, MD   atorvastatin (LIPITOR) 10 MG tablet Take 10 mg by mouth daily   Yes Historical Provider, MD   nystatin-triamcinolone (MYCOLOG II) 131207-9.1 UNIT/GM-% cream Apply topically 2 times daily. 20  Yes NAYELY Engel - CNP   empagliflozin (JARDIANCE) 25 MG tablet Take 25 mg by mouth daily   Yes Historical Provider, MD   Cholecalciferol (VITAMIN D PO) Take by mouth   Yes Historical Provider, MD   nystatin (MYCOSTATIN) 111451 UNIT/GM powder Apply small amount under breasts 3 times daily for 10 days 10/9/19  Yes NAYELY Kan - CNP   Eszopiclone (LUNESTA PO) Take  by mouth. Yes Historical Provider, MD   duloxetine (CYMBALTA) 60 MG capsule Take 60 mg by mouth daily. Yes Historical Provider, MD   cyclobenzaprine (FLEXERIL) 10 MG tablet Take 10 mg by mouth 3 times daily as needed. Yes Historical Provider, MD   metformin (GLUCOPHAGE) 1000 MG tablet Take 1,000 mg by mouth 2 times daily (with meals). Yes Historical Provider, MD   alprazolam Wendelin Solorzano) 0.5 MG tablet Take 0.5 mg by mouth nightly as needed.      Yes Historical Provider, MD       Current medications:    Current Facility-Administered Medications   Medication Dose Route Frequency Provider Last Rate Last Dose    0.9 % sodium chloride infusion   Intravenous Continuous Bright Santhosh Williamson  mL/hr at 08/04/20 0959         Allergies: Allergies   Allergen Reactions    Diflucan [Fluconazole] Hives       Problem List:    Patient Active Problem List   Diagnosis Code    Diffuse cystic mastopathy N60.19    S/P breast biopsy Z98.890    Fibromyalgia M79.7       Past Medical History:        Diagnosis Date    Anxiety     Fibromyalgia     Hyperlipidemia     Thyroid disease     Type II or unspecified type diabetes mellitus without mention of complication, not stated as uncontrolled        Past Surgical History:        Procedure Laterality Date    BREAST BIOPSY  4/19/2012    stereotactic right, fat necrosis and foci of fibrosis    CHOLECYSTECTOMY      DILATION AND CURETTAGE OF UTERUS      LAP BAND  2009    OTHER SURGICAL HISTORY      uterine ablation       Social History:    Social History     Tobacco Use    Smoking status: Never Smoker    Smokeless tobacco: Never Used   Substance Use Topics    Alcohol use: Not Currently     Comment: occ                                Counseling given: Not Answered      Vital Signs (Current):   Vitals:    08/04/20 0954   BP: 133/82   Pulse: 79   Resp: 18   Temp: 97.7 °F (36.5 °C)   SpO2: 100%   Weight: 188 lb (85.3 kg)   Height: 5' 5\" (1.651 m)                                              BP Readings from Last 3 Encounters:   08/04/20 133/82   07/29/20 130/80   07/27/20 (!) 127/94       NPO Status: Time of last liquid consumption: 0530                        Time of last solid consumption: 2300                        Date of last liquid consumption: 08/04/20                        Date of last solid food consumption: 08/03/20    BMI:   Wt Readings from Last 3 Encounters:   08/04/20 188 lb (85.3 kg)   07/29/20 190 lb (86.2 kg)   07/27/20 194 lb (88 kg)     Body mass index is 31.28 kg/m².     CBC:   Lab Results   Component Value Date    WBC 9.9 10/09/2019    RBC 6.42 10/09/2019    HGB 17.5 10/09/2019    HCT 52.1 10/09/2019    MCV 81.2 10/09/2019 RDW 17.0 10/09/2019     10/09/2019       CMP:   Lab Results   Component Value Date     10/09/2019    K 4.0 10/09/2019     10/09/2019    CO2 17 10/09/2019    BUN 9 10/09/2019    CREATININE 0.8 10/09/2019    LABGLOM >60 10/09/2019    GLUCOSE 261 10/09/2019    PROT 7.2 10/09/2019    CALCIUM 10.0 10/09/2019    BILITOT 0.4 10/09/2019    ALKPHOS 172 10/09/2019    AST 12 10/09/2019    ALT 14 10/09/2019       POC Tests: No results for input(s): POCGLU, POCNA, POCK, POCCL, POCBUN, POCHEMO, POCHCT in the last 72 hours. Coags: No results found for: PROTIME, INR, APTT    HCG (If Applicable): No results found for: PREGTESTUR, PREGSERUM, HCG, HCGQUANT     ABGs: No results found for: PHART, PO2ART, ELH2BKO, HGA3KXB, BEART, X2XEECSH     Type & Screen (If Applicable):  No results found for: LABABO, LABRH    Drug/Infectious Status (If Applicable):  No results found for: HIV, HEPCAB    COVID-19 Screening (If Applicable):   Lab Results   Component Value Date    COVID19 Not Detected 07/31/2020         Anesthesia Evaluation  Patient summary reviewed and Nursing notes reviewed  Airway: Mallampati: II  TM distance: >3 FB     Mouth opening: > = 3 FB Dental:          Pulmonary:Negative Pulmonary ROS and normal exam                               Cardiovascular:Negative CV ROS                      Neuro/Psych:                ROS comment: Fibromyalgia  GI/Hepatic/Renal: Neg GI/Hepatic/Renal ROS            Endo/Other:    (+) DiabetesType II DM, , .                 Abdominal:           Vascular:                                        Anesthesia Plan      general     ASA 2       Induction: intravenous. Anesthetic plan and risks discussed with patient.                       NAYELY Her - INOCENTE   8/4/2020

## 2020-08-04 NOTE — ANESTHESIA POSTPROCEDURE EVALUATION
Department of Anesthesiology  Postprocedure Note    Patient: Julisa Beaulieu  MRN: [id number]  YOB: 1970  Date of evaluation: 8/4/2020  Time:  11:40 AM     Procedure Summary     Date:  08/04/20 Room / Location:  [facility] ENDO 02 / [address]    Anesthesia Start:  5816 Anesthesia Stop:  7781    Procedure:  COLONOSCOPY DIAGNOSTIC (N/A Abdomen) Diagnosis:  (LOWER ABD PAIN, CHRONIC CONSTIPATION)    Surgeon:  Bessy Feliciano MD Responsible Provider:  Verneita Bosworth, APRN - CRNA    Anesthesia Type:  general ASA Status:  2          Anesthesia Type: general    Trevon Phase I:      Trevon Phase II:      Last vitals: Reviewed and per EMR flowsheets.        Anesthesia Post Evaluation    Patient location during evaluation: bedside  Patient participation: complete - patient participated  Level of consciousness: sleepy but conscious  Pain score: 0  Airway patency: patent  Nausea & Vomiting: no nausea and no vomiting  Complications: no  Cardiovascular status: hemodynamically stable  Respiratory status: acceptable  Hydration status: euvolemic

## 2020-08-04 NOTE — OP NOTE
Patient: Jaylene Kirkpatrick : 1970  Med Rec#: [id number] Acc#: [id number]   Primary Care Provider Chadd Madrigal    Date of Procedure:  2020    Endoscopist: Jaison Marrero MD    Referring Provider: Chadd Madrigal,     Operation Performed: Colonoscopy up to the Cecum    Indications:   1. Lower abdominal pain    2. Constipation, unspecified constipation type    3. Bloating      Anesthesia:  Sedation was administered by anesthesia who monitored the patient during the procedure. I met with Jaylene Kirkpatrick prior to procedure. We discussed the procedure itself, and I have discussed the risks of endoscopy (including-- but not limited to-- pain, discomfort, bleeding potentially requiring second endoscopic procedure and/or blood transfusion, organ perforation requiring operative repair, damage to organs near the colon, infection, aspiration, cardiopulmonary/allergic reaction), benefits, indications to endoscopy. Additionally, we discussed options other than colonoscopy. The patient expressed understanding. All questions answered. The patient decided to proceed with the procedure. Signed informed consent was placed on the chart. Blood Loss: minimal    Withdrawal time: >6 mins  Bowel Prep: Fair with thick solid and semisolid stool scattered in segments throughout the colon obscuring the underlying mucosa. Small lesions including polyps may have been missed. Complications: no immediate complications    DESCRIPTION OF PROCEDURE:     A time out was performed. After written informed consent was obtained, the patient was placed in the left lateral position. The perianal area was inspected, and a digital rectal exam was performed. A rectal exam was performed: normal tone, no palpable lesions.  At this point, a forward viewing Olympus colonoscope was inserted into the anus and carefully advanced to the Cecum with some difficulty due to a redundant colon and suboptimal prep quality, requiring external abdominal pressure during parts of this procedure. The cecum was identified by the ileocecal valve and the appendiceal orifice. The colonoscope was then slowly withdrawn with careful inspection of the mucosa in a linear and circumferential fashion. The scope was retroflexed in the rectum. Suction was utilized during the procedure to remove as much air as possible from the bowel. The colonoscope was removed from the patient, and the procedure was terminated. Findings are listed below. Findings:     NO large polyps or masses or strictures or colitis. Suboptimal exam due to prep quality as described above. Where it was clearly visible, the mucosa appeared normal throughout the entire examined colon  Retroflexion in the rectum was otherwise normal and revealed no further abnormalities. Her constipation is likely due to dietary reasons; pelvic floor dysfunction and non-GI causes are in the DDx    Recommendations:  1. Repeat colonoscopy: for CRCS due to her suboptimal prep today in 3 years. 2. - Resume previous meds and high fiber diet  - GI clinic f/u 4-6 weeks with Ms. Laura Lam. - Keep scheduled f/u appts with other MDs     - NO ASA/NSAIDs x 2 weeks    Findings and recommendations were discussed w/ the patient. A copy of the images was provided.     Noah Dixon MD  8/4/2020  11:15 AM

## 2020-08-05 ENCOUNTER — TELEPHONE (OUTPATIENT)
Dept: GASTROENTEROLOGY | Age: 50
End: 2020-08-05

## 2020-08-05 NOTE — TELEPHONE ENCOUNTER
Last OV as NP with [facility] 7-29-20. CLN yesterday 8-3-20 with . There is a FU scheduled on 10-29-20 which the patient wants to keep on the schedule at present. Patient called from [phone] said when she had her CLN Gaby Canela was not able to see all the CLN due to suboptimal prep. Patient wants appointment with Lonnie Cummings sooner to discuss issues she has with a lot of gas which smells very bad and stool as well, patient is just starting the Linzess samples given to her by [facility] and has about 10 days worth and is hopeful they will work for her.  At the patient's request I have her scheduled to see [facility] ap[facility] on 8-10-20 @ 8:20 am.  katy

## 2020-08-10 ENCOUNTER — OFFICE VISIT (OUTPATIENT)
Dept: GASTROENTEROLOGY | Age: 50
End: 2020-08-10
Payer: COMMERCIAL

## 2020-08-10 VITALS
HEIGHT: 65 IN | BODY MASS INDEX: 32.15 KG/M2 | OXYGEN SATURATION: 99 % | HEART RATE: 82 BPM | SYSTOLIC BLOOD PRESSURE: 115 MMHG | WEIGHT: 193 LBS | DIASTOLIC BLOOD PRESSURE: 80 MMHG

## 2020-08-10 PROCEDURE — 99214 OFFICE O/P EST MOD 30 MIN: CPT | Performed by: NURSE PRACTITIONER

## 2020-08-10 ASSESSMENT — ENCOUNTER SYMPTOMS
ABDOMINAL DISTENTION: 0
CHOKING: 0
VOMITING: 0
SHORTNESS OF BREATH: 0
NAUSEA: 0
DIARRHEA: 0
BLOOD IN STOOL: 0
TROUBLE SWALLOWING: 0
CONSTIPATION: 1
ABDOMINAL PAIN: 1
RECTAL PAIN: 0
COUGH: 0
ANAL BLEEDING: 0

## 2020-08-10 NOTE — PROGRESS NOTES
Subjective:     Patient ID: Vicky Yoo is a 44 y.o. female  PCP: Annie Celis  Referring Provider: No ref. provider found    HPI  Patient presents to the office today with the following complaints: Follow-up    Vicky Yoo is here for follow up after Colonoscopy on 8/4/2020. During her last visit, she had c/o constipation and abdominal pain. Today, she reports symptoms have not improved. She is currently taking Linzess, Miralax, and Benefiber. She is reporting abdominal pain, foul smelling odors, bloating, constipation. She states during the colon prep things were feeling better, until she started eating again. She reports daily BM, stools described are soft. Denies any straining. Pain is lower abdomen. She is concerned with the odor of her stools. She is requesting stool testing today. She states she has tried Miralax in the past and she reports side effects with this. Findings:   NO large polyps or masses or strictures or colitis. Suboptimal exam due to prep quality as described above. Where it was clearly visible, the mucosa appeared normal throughout the entire examined colon  Retroflexion in the rectum was otherwise normal and revealed no further abnormalities. Her constipation is likely due to dietary reasons; pelvic floor dysfunction and non-GI causes are in the DDx   Recommendations:  1. Repeat colonoscopy: for CRCS due to her suboptimal prep today in 3 years. 2. - Resume previous meds and high fiber diet  - GI clinic f/u 4-6 weeks with Ms. Yoav De La Paz. - Keep scheduled f/u appts with other MDs   - NO ASA/NSAIDs x 2 weeks    All scope and pathology reports were reviewed and discussed with patient. All questions answered, pt verbalized understanding. Assessment:     1. Irritable bowel syndrome with constipation    2. Lower abdominal pain    3.  Constipation, unspecified constipation type            Plan:   - [facility] for GI panel r/o infectious process per patient request  - Continue Linzess 72 mcg po daily  - Ok to stop Miralax, continue Benefiber  - Keep already scheduled appt or sooner if needed  - Call with any questions or concerns    Orders  No orders of the defined types were placed in this encounter.     Medications  Orders Placed This Encounter   Medications    linaCLOtide (LINZESS) 67 MCG CAPS capsule     Sig: Take 1 capsule by mouth every morning (before breakfast)     Dispense:  30 capsule     Refill:  2         Patient History:     Past Medical History:   Diagnosis Date    Anxiety     Fibromyalgia     Hyperlipidemia     Thyroid disease     Type II or unspecified type diabetes mellitus without mention of complication, not stated as uncontrolled        Past Surgical History:   Procedure Laterality Date    BREAST BIOPSY Right 04/19/2012    stereotactic, fat necrosis and foci of fibrosis    CHOLECYSTECTOMY      COLONOSCOPY N/A 8/4/2020    Dr Alicea Oms prep, 3 yr recall    DILATION AND CURETTAGE OF UTERUS      LAP BAND  2009    OTHER SURGICAL HISTORY      uterine ablation       Family History   Problem Relation Age of Onset    Heart Disease Father     Colon Cancer Neg Hx     Colon Polyps Neg Hx        Social History     Socioeconomic History    Marital status:      Spouse name: None    Number of children: None    Years of education: None    Highest education level: None   Occupational History    None   Social Needs    Financial resource strain: None    Food insecurity     Worry: None     Inability: None    Transportation needs     Medical: None     Non-medical: None   Tobacco Use    Smoking status: Never Smoker    Smokeless tobacco: Never Used   Substance and Sexual Activity    Alcohol use: Not Currently     Comment: occ    Drug use: No    Sexual activity: None   Lifestyle    Physical activity     Days per week: None     Minutes per session: None    Stress: None   Relationships    Social connections     Talks on phone: None Gets together: None     Attends Sabianism service: None     Active member of club or organization: None     Attends meetings of clubs or organizations: None     Relationship status: None    Intimate partner violence     Fear of current or ex partner: None     Emotionally abused: None     Physically abused: None     Forced sexual activity: None   Other Topics Concern    None   Social History Narrative    None       Current Outpatient Medications   Medication Sig Dispense Refill    linaCLOtide (LINZESS) 72 MCG CAPS capsule Take 1 capsule by mouth every morning (before breakfast) 30 capsule 2    levothyroxine (SYNTHROID) 50 MCG tablet Take 50 mcg by mouth Daily      atorvastatin (LIPITOR) 10 MG tablet Take 10 mg by mouth daily      nystatin-triamcinolone (MYCOLOG II) 670289-5.1 UNIT/GM-% cream Apply topically 2 times daily. 1 Tube 1    empagliflozin (JARDIANCE) 25 MG tablet Take 25 mg by mouth daily      Cholecalciferol (VITAMIN D PO) Take by mouth      nystatin (MYCOSTATIN) 675979 UNIT/GM powder Apply small amount under breasts 3 times daily for 10 days 1 Bottle 0    Eszopiclone (LUNESTA PO) Take  by mouth.  duloxetine (CYMBALTA) 60 MG capsule Take 60 mg by mouth daily.  cyclobenzaprine (FLEXERIL) 10 MG tablet Take 10 mg by mouth 3 times daily as needed.  metformin (GLUCOPHAGE) 1000 MG tablet Take 1,000 mg by mouth 2 times daily (with meals).  alprazolam (XANAX) 0.5 MG tablet Take 0.5 mg by mouth nightly as needed. No current facility-administered medications for this visit. Allergies   Allergen Reactions    Diflucan [Fluconazole] Hives       Review of Systems   Constitutional: Negative for activity change, appetite change, fatigue, fever and unexpected weight change. HENT: Negative for trouble swallowing. Respiratory: Negative for cough, choking and shortness of breath. Cardiovascular: Negative for chest pain.    Gastrointestinal: Positive for abdominal pain and constipation (improving). Negative for abdominal distention, anal bleeding, blood in stool, diarrhea, nausea, rectal pain and vomiting. Foul smelling stools     Allergic/Immunologic: Negative for food allergies. All other systems reviewed and are negative. Objective:     /80   Pulse 82   Ht 5' 5\" (1.651 m)   Wt 193 lb (87.5 kg)   LMP 10/31/2014   SpO2 99%   BMI 32.12 kg/m²     Physical Exam  Vitals signs reviewed. Constitutional:       General: She is not in acute distress. Appearance: She is well-developed. HENT:      Nose:      Comments: Mask in place       Mouth/Throat:      Comments: Mask in place  Eyes:      General:         Right eye: No discharge. Left eye: No discharge. Neck:      Musculoskeletal: Normal range of motion. Cardiovascular:      Rate and Rhythm: Normal rate and regular rhythm. Heart sounds: No murmur. Pulmonary:      Effort: Pulmonary effort is normal. No respiratory distress. Breath sounds: Normal breath sounds. Abdominal:      General: Bowel sounds are normal. There is no distension. Palpations: Abdomen is soft. There is no mass. Tenderness: There is generalized abdominal tenderness. There is no guarding or rebound. Musculoskeletal: Normal range of motion. Skin:     General: Skin is warm and dry. Neurological:      Mental Status: She is alert and oriented to person, place, and time.    Psychiatric:         Behavior: Behavior normal.

## 2020-08-10 NOTE — PATIENT INSTRUCTIONS
Swallow the mixture right away without chewing. Do not save it for later use. Even if you have taken this medicine with applesauce, wait at least 30 minutes before eating a full meal.  If needed, medicine from the linaclotide capsule may be given through a nasogastric (NG) or gastronomy tube. Read all patient information, medication guides, and instruction sheets provided to you. Carefully follow instructions for mixing medicine from the capsule with applesauce or water, or giving the medicine through a feeding tube. Ask your doctor or pharmacist if you have any questions. It may take up to 2 weeks before your symptoms improve. Keep using the medication as directed. Call your doctor if your symptoms do not improve, or if they get worse while using linaclotide. Store at room temperature away from moisture and heat. Keep this medicine out of the reach of children. Linaclotide can cause severe diarrhea and dehydration in a child who accidentally swallows this medicine. Seek emergency medical attention if this happens. Keep the capsules in their original container, along with the packet of moisture-absorbing preservative that comes with this medicine. Keep the bottle tightly closed when not in use. What happens if I miss a dose? Skip the missed dose and take the medicine at your next regularly scheduled time. Do not take extra medicine to make up the missed dose. What happens if I overdose? Seek emergency medical attention or call the Poison Help line at 1-913.690.4095. What should I avoid while taking linaclotide? Follow your doctor's instructions about any restrictions on food, beverages, or activity. What are the possible side effects of linaclotide? Get emergency medical help if you have any of these signs of an allergic reaction: hives; difficult breathing; swelling of your face, lips, tongue, or throat.   Stop using linaclotide and call your doctor at once if you have:  · severe or ongoing viewing this service as a supplement to, and not a substitute for, the expertise, skill, knowledge and judgment of healthcare practitioners. The absence of a warning for a given drug or drug combination in no way should be construed to indicate that the drug or drug combination is safe, effective or appropriate for any given patient. [facility] does not assume any responsibility for any aspect of healthcare administered with the aid of information [facility] provides. The information contained herein is not intended to cover all possible uses, directions, precautions, warnings, drug interactions, allergic reactions, or adverse effects. If you have questions about the drugs you are taking, check with your doctor, nurse or pharmacist.  Copyright 4058-5341 [address] [place] [address] Dr RAMIREZ. Version: 4.02. Revision date: 9/19/2017. Care instructions adapted under license by [facility] ([facility]). If you have questions about a medical condition or this instruction, always ask your healthcare professional. [place] [address] any warranty or liability for your use of this information.

## 2020-10-22 ENCOUNTER — OFFICE VISIT (OUTPATIENT)
Age: 50
End: 2020-10-22

## 2020-10-22 VITALS — TEMPERATURE: 97.7 F | HEART RATE: 95 BPM | OXYGEN SATURATION: 97 %

## 2020-10-22 RX ORDER — LINACLOTIDE 72 UG/1
CAPSULE, GELATIN COATED ORAL
Qty: 30 CAPSULE | Refills: 2 | Status: SHIPPED | OUTPATIENT
Start: 2020-10-22 | End: 2020-10-29

## 2020-10-24 LAB — SARS-COV-2, NAA: NOT DETECTED

## 2020-10-29 ENCOUNTER — OFFICE VISIT (OUTPATIENT)
Dept: GASTROENTEROLOGY | Age: 50
End: 2020-10-29
Payer: COMMERCIAL

## 2020-10-29 VITALS
DIASTOLIC BLOOD PRESSURE: 72 MMHG | OXYGEN SATURATION: 98 % | HEART RATE: 76 BPM | SYSTOLIC BLOOD PRESSURE: 128 MMHG | WEIGHT: 197.6 LBS | BODY MASS INDEX: 32.92 KG/M2 | HEIGHT: 65 IN

## 2020-10-29 PROCEDURE — 99213 OFFICE O/P EST LOW 20 MIN: CPT | Performed by: NURSE PRACTITIONER

## 2020-10-29 PROCEDURE — G8427 DOCREV CUR MEDS BY ELIG CLIN: HCPCS | Performed by: NURSE PRACTITIONER

## 2020-10-29 PROCEDURE — 1036F TOBACCO NON-USER: CPT | Performed by: NURSE PRACTITIONER

## 2020-10-29 PROCEDURE — G8484 FLU IMMUNIZE NO ADMIN: HCPCS | Performed by: NURSE PRACTITIONER

## 2020-10-29 PROCEDURE — G8417 CALC BMI ABV UP PARAM F/U: HCPCS | Performed by: NURSE PRACTITIONER

## 2020-10-29 PROCEDURE — 3017F COLORECTAL CA SCREEN DOC REV: CPT | Performed by: NURSE PRACTITIONER

## 2020-10-29 ASSESSMENT — ENCOUNTER SYMPTOMS
BLOOD IN STOOL: 0
RECTAL PAIN: 0
VOMITING: 0
TROUBLE SWALLOWING: 0
CONSTIPATION: 1
CHOKING: 0
DIARRHEA: 0
SHORTNESS OF BREATH: 0
ABDOMINAL PAIN: 1
ANAL BLEEDING: 0
COUGH: 0
NAUSEA: 0
ABDOMINAL DISTENTION: 0

## 2020-10-29 NOTE — PATIENT INSTRUCTIONS
Patient Education        linaclotide  Pronunciation:  SUDHAKAR prieto SABINE tide  Brand:  Linzess  What is the most important information I should know about linaclotide? You should not use linaclotide if you have a blockage in your intestines. Linaclotide should not be given to a child younger than 10years old. Linaclotide can cause severe dehydration in a child. What is linaclotide? Linaclotide works by increasing the secretion of chloride and water in the intestines, which can soften stools and stimulate bowel movements. Linaclotide is used to treat chronic constipation, or chronic irritable bowel syndrome (IBS) in people who have had constipation as the main symptom. Linaclotide may also be used for purposes not listed in this medication guide. What should I discuss with my healthcare provider before taking linaclotide? You should not use linaclotide if you are allergic to it, or if you have:  · a blockage in your intestines. Linaclotide can cause severe dehydration in a child. Linaclotide should not be given to a child younger than 10years old. Do not give this medicine to any child or teenager without the advice of a doctor. It is not known whether linaclotide will harm an unborn baby. Tell your doctor if you are pregnant or plan to become pregnant while using this medicine. It is not known whether linaclotide passes into breast milk or if it could harm a nursing baby. Tell your doctor if you are breast-feeding a baby. How should I take linaclotide? Follow the directions on your prescription label. Do not take this medicine in larger or smaller amounts or for longer than recommended. Take linaclotide in the morning on an empty stomach, at least 30 minutes before your first meal.  Do not crush, chew, or break a linaclotide capsule. Swallow it whole. If you cannot swallow the linaclotide capsule whole, you may open the capsule and sprinkle the medicine into a spoonful of applesauce or bottled water. Swallow the mixture right away without chewing. Do not save it for later use. Even if you have taken this medicine with applesauce, wait at least 30 minutes before eating a full meal.  If needed, medicine from the linaclotide capsule may be given through a nasogastric (NG) or gastronomy tube. Read all patient information, medication guides, and instruction sheets provided to you. Carefully follow instructions for mixing medicine from the capsule with applesauce or water, or giving the medicine through a feeding tube. Ask your doctor or pharmacist if you have any questions. It may take up to 2 weeks before your symptoms improve. Keep using the medication as directed. Call your doctor if your symptoms do not improve, or if they get worse while using linaclotide. Store at room temperature away from moisture and heat. Keep this medicine out of the reach of children. Linaclotide can cause severe diarrhea and dehydration in a child who accidentally swallows this medicine. Seek emergency medical attention if this happens. Keep the capsules in their original container, along with the packet of moisture-absorbing preservative that comes with this medicine. Keep the bottle tightly closed when not in use. What happens if I miss a dose? Skip the missed dose and take the medicine at your next regularly scheduled time. Do not take extra medicine to make up the missed dose. What happens if I overdose? Seek emergency medical attention or call the Poison Help line at 1-953.752.1340. What should I avoid while taking linaclotide? Follow your doctor's instructions about any restrictions on food, beverages, or activity. What are the possible side effects of linaclotide? Get emergency medical help if you have any of these signs of an allergic reaction: hives; difficult breathing; swelling of your face, lips, tongue, or throat.   Stop using linaclotide and call your doctor at once if you have:  · severe or ongoing diarrhea;  · diarrhea with dizziness or a light-headed feeling (like you might pass out);  · signs of an electrolyte imbalance --increased thirst or urination, leg cramps, mood changes, confusion, feeling unsteady, irregular heartbeats, fluttering in your chest, muscle weakness or limp feeling;  · severe stomach pain; or  · black, bloody, or tarry stools. Common side effects may include:  · diarrhea;  · stomach pain;  · gas; or  · bloating or full feeling in your stomach. This is not a complete list of side effects and others may occur. Call your doctor for medical advice about side effects. You may report side effects to FDA at 0-489-FDA-2952. What other drugs will affect linaclotide? Other drugs may interact with linaclotide, including prescription, over-the-counter, vitamin, and herbal products. Tell your doctor about all medications you use, and those you start or stop using during your treatment with linaclotide. Where can I get more information? Your pharmacist can provide more information about linaclotide. Remember, keep this and all other medicines out of the reach of children, never share your medicines with others, and use this medication only for the indication prescribed. Every effort has been made to ensure that the information provided by [address] is accurate, up-to-date, and complete, but no guarantee is made to that effect. Drug information contained herein may be time sensitive. [facility] information has been compiled for use by healthcare practitioners and consumers in the [place] and therefore [email or web address] does not warrant that uses outside of the [place] are appropriate, unless specifically indicated otherwise. [facility]'s drug information does not endorse drugs, diagnose patients or recommend therapy.  [facility]'s drug information is an informational resource designed to assist licensed healthcare practitioners in caring for their patients and/or to serve consumers viewing this service as a supplement to, and not a substitute for, the expertise, skill, knowledge and judgment of healthcare practitioners. The absence of a warning for a given drug or drug combination in no way should be construed to indicate that the drug or drug combination is safe, effective or appropriate for any given patient. [facility] does not assume any responsibility for any aspect of healthcare administered with the aid of information [facility] provides. The information contained herein is not intended to cover all possible uses, directions, precautions, warnings, drug interactions, allergic reactions, or adverse effects. If you have questions about the drugs you are taking, check with your doctor, nurse or pharmacist.  Copyright 8225-8891 [facility] RAMIREZ. Version: 4.02. Revision date: 9/19/2017. Care instructions adapted under license by [facility] ([facility]). If you have questions about a medical condition or this instruction, always ask your healthcare professional. [place] [address] any warranty or liability for your use of this information.

## 2020-10-29 NOTE — PROGRESS NOTES
Subjective:     Patient ID: India Mckeon is a 44 y.o. female  PCP: Ry Rajput DO  Referring Provider: No ref. provider found    HPI  Patient presents to the office today with the following complaints: Follow-up      Pt following up on IBS with constipation. Recent normal colonoscopy. She is taking Linzess 72 mcg po daily. Today, she reports symptoms have improved. However, she is still having some continued abdominal pain. Stools are soft, not straining. Has feeling of tenesmus. Pain is now couple times a week at the most.  BM every couple of days. She feels better, however, continues to have some mild issues. Last Colonoscopy 8/4/2020 - normal     Assessment:     1. Irritable bowel syndrome with constipation            Plan:   - Increase Linzess to 145 mcg po daily, samples provided  - Follow up in 6 months or sooner if needed  - Call with any questions or concerns      Orders  No orders of the defined types were placed in this encounter.     Medications  Orders Placed This Encounter   Medications    linaclotide (LINZESS) 145 MCG capsule     Sig: Take 1 capsule by mouth every morning (before breakfast)     Dispense:  30 capsule     Refill:  5         Patient History:     Past Medical History:   Diagnosis Date    Anxiety     Fibromyalgia     Hyperlipidemia     Thyroid disease     Type II or unspecified type diabetes mellitus without mention of complication, not stated as uncontrolled        Past Surgical History:   Procedure Laterality Date    BREAST BIOPSY Right 04/19/2012    stereotactic, fat necrosis and foci of fibrosis    CHOLECYSTECTOMY      COLONOSCOPY N/A 8/4/2020    Dr Chloe Jaeger prep, 3 yr recall    DILATION AND CURETTAGE OF UTERUS      LAP BAND  2009    OTHER SURGICAL HISTORY      uterine ablation       Family History   Problem Relation Age of Onset    Heart Disease Father     Colon Cancer Neg Hx     Colon Polyps Neg Hx     Esophageal Cancer Neg times daily for 10 days 1 Bottle 0    Eszopiclone (LUNESTA PO) Take by mouth nightly       duloxetine (CYMBALTA) 60 MG capsule Take 60 mg by mouth daily.  cyclobenzaprine (FLEXERIL) 10 MG tablet Take 10 mg by mouth 3 times daily as needed.  metformin (GLUCOPHAGE) 1000 MG tablet Take 1,000 mg by mouth 2 times daily (with meals).  alprazolam (XANAX) 0.5 MG tablet Take 0.5 mg by mouth nightly as needed. No current facility-administered medications for this visit. Allergies   Allergen Reactions    Diflucan [Fluconazole] Hives       Review of Systems   Constitutional: Negative for activity change, appetite change, fatigue, fever and unexpected weight change. HENT: Negative for trouble swallowing. Respiratory: Negative for cough, choking and shortness of breath. Cardiovascular: Negative for chest pain. Gastrointestinal: Positive for abdominal pain (improving) and constipation (improving). Negative for abdominal distention, anal bleeding, blood in stool, diarrhea, nausea, rectal pain and vomiting. Allergic/Immunologic: Negative for food allergies. All other systems reviewed and are negative. Objective:     /72   Pulse 76   Ht 5' 5\" (1.651 m)   Wt 197 lb 9.6 oz (89.6 kg)   LMP 10/31/2014   SpO2 98%   BMI 32.88 kg/m²     Physical Exam  Vitals signs reviewed. Constitutional:       General: She is not in acute distress. Appearance: She is well-developed. HENT:      Nose:      Comments: Mask on     Mouth/Throat:      Comments: Mask on  Eyes:      General:         Right eye: No discharge. Left eye: No discharge. Neck:      Musculoskeletal: Normal range of motion. Cardiovascular:      Rate and Rhythm: Normal rate and regular rhythm. Heart sounds: No murmur. Pulmonary:      Effort: Pulmonary effort is normal. No respiratory distress. Breath sounds: Normal breath sounds.    Abdominal:      General: Bowel sounds are normal. There is no distension. Palpations: Abdomen is soft. There is no mass. Tenderness: There is no abdominal tenderness. There is no guarding or rebound. Musculoskeletal: Normal range of motion. Skin:     General: Skin is warm and dry. Neurological:      Mental Status: She is alert and oriented to person, place, and time.    Psychiatric:         Behavior: Behavior normal.

## 2020-12-17 ENCOUNTER — OFFICE VISIT (OUTPATIENT)
Age: 50
End: 2020-12-17

## 2020-12-17 VITALS — HEART RATE: 84 BPM | TEMPERATURE: 97.5 F | OXYGEN SATURATION: 99 %

## 2020-12-18 LAB — SARS-COV-2, NAA: NOT DETECTED

## 2021-01-09 ENCOUNTER — IMMUNIZATION (OUTPATIENT)
Dept: VACCINE CLINIC | Facility: HOSPITAL | Age: 51
End: 2021-01-09

## 2021-01-09 PROCEDURE — 91301 HC SARSCO02 VAC 100MCG/0.5ML IM: CPT | Performed by: OBSTETRICS & GYNECOLOGY

## 2021-01-09 PROCEDURE — 0011A: CPT | Performed by: OBSTETRICS & GYNECOLOGY

## 2021-02-06 ENCOUNTER — IMMUNIZATION (OUTPATIENT)
Dept: VACCINE CLINIC | Facility: HOSPITAL | Age: 51
End: 2021-02-06

## 2021-02-06 PROCEDURE — 91301 HC SARSCO02 VAC 100MCG/0.5ML IM: CPT | Performed by: OBSTETRICS & GYNECOLOGY

## 2021-02-06 PROCEDURE — 0012A: CPT | Performed by: OBSTETRICS & GYNECOLOGY

## 2021-04-23 RX ORDER — LINACLOTIDE 145 UG/1
CAPSULE, GELATIN COATED ORAL
Qty: 30 CAPSULE | Refills: 5 | Status: SHIPPED | OUTPATIENT
Start: 2021-04-23 | End: 2021-10-15

## 2021-08-05 ENCOUNTER — HOSPITAL ENCOUNTER (OUTPATIENT)
Age: 51
Setting detail: OBSERVATION
Discharge: HOME | End: 2021-08-06
Attending: EMERGENCY MEDICINE | Admitting: STUDENT IN AN ORGANIZED HEALTH CARE EDUCATION/TRAINING PROGRAM
Payer: COMMERCIAL

## 2021-08-05 ENCOUNTER — APPOINTMENT (OUTPATIENT)
Dept: GENERAL RADIOLOGY | Age: 51
End: 2021-08-05
Payer: COMMERCIAL

## 2021-08-05 DIAGNOSIS — R07.9 CHEST PAIN, UNSPECIFIED TYPE: Primary | ICD-10-CM

## 2021-08-05 PROBLEM — E78.5 HYPERLIPIDEMIA: Status: ACTIVE | Noted: 2021-08-05

## 2021-08-05 PROBLEM — E66.9 OBESITY: Status: ACTIVE | Noted: 2021-08-05

## 2021-08-05 PROBLEM — E11.9 DIABETES MELLITUS (HCC): Status: ACTIVE | Noted: 2021-08-05

## 2021-08-05 LAB
ALBUMIN SERPL-MCNC: 4.5 G/DL (ref 3.5–5.2)
ALP BLD-CCNC: 124 U/L (ref 35–104)
ALT SERPL-CCNC: 20 U/L (ref 5–33)
ANION GAP SERPL CALCULATED.3IONS-SCNC: 11 MMOL/L (ref 7–19)
AST SERPL-CCNC: 15 U/L (ref 5–32)
BASOPHILS ABSOLUTE: 0.1 K/UL (ref 0–0.2)
BASOPHILS RELATIVE PERCENT: 1.4 % (ref 0–1)
BILIRUB SERPL-MCNC: 0.5 MG/DL (ref 0.2–1.2)
BUN BLDV-MCNC: 15 MG/DL (ref 6–20)
CALCIUM SERPL-MCNC: 10 MG/DL (ref 8.6–10)
CHLORIDE BLD-SCNC: 101 MMOL/L (ref 98–111)
CO2: 24 MMOL/L (ref 22–29)
CREAT SERPL-MCNC: 0.8 MG/DL (ref 0.5–0.9)
EOSINOPHILS ABSOLUTE: 0.6 K/UL (ref 0–0.6)
EOSINOPHILS RELATIVE PERCENT: 7.7 % (ref 0–5)
GFR AFRICAN AMERICAN: >59
GFR NON-AFRICAN AMERICAN: >60
GLUCOSE BLD-MCNC: 129 MG/DL (ref 70–99)
GLUCOSE BLD-MCNC: 147 MG/DL (ref 70–99)
GLUCOSE BLD-MCNC: 172 MG/DL (ref 70–99)
GLUCOSE BLD-MCNC: 257 MG/DL (ref 74–109)
HCT VFR BLD CALC: 47.5 % (ref 37–47)
HEMOGLOBIN: 15.9 G/DL (ref 12–16)
IMMATURE GRANULOCYTES #: 0 K/UL
LV EF: 74 %
LVEF MODALITY: NORMAL
LYMPHOCYTES ABSOLUTE: 2.1 K/UL (ref 1.1–4.5)
LYMPHOCYTES RELATIVE PERCENT: 26.6 % (ref 20–40)
MCH RBC QN AUTO: 29.2 PG (ref 27–31)
MCHC RBC AUTO-ENTMCNC: 33.5 G/DL (ref 33–37)
MCV RBC AUTO: 87.3 FL (ref 81–99)
MONOCYTES ABSOLUTE: 0.5 K/UL (ref 0–0.9)
MONOCYTES RELATIVE PERCENT: 5.9 % (ref 0–10)
NEUTROPHILS ABSOLUTE: 4.6 K/UL (ref 1.5–7.5)
NEUTROPHILS RELATIVE PERCENT: 58.1 % (ref 50–65)
PDW BLD-RTO: 13.6 % (ref 11.5–14.5)
PERFORMED ON: ABNORMAL
PLATELET # BLD: 235 K/UL (ref 130–400)
PMV BLD AUTO: 9.6 FL (ref 9.4–12.3)
POTASSIUM REFLEX MAGNESIUM: 4.1 MMOL/L (ref 3.5–5)
PRO-BNP: 37 PG/ML (ref 0–900)
RBC # BLD: 5.44 M/UL (ref 4.2–5.4)
SARS-COV-2, NAAT: NOT DETECTED
SODIUM BLD-SCNC: 136 MMOL/L (ref 136–145)
TOTAL PROTEIN: 7.3 G/DL (ref 6.6–8.7)
TROPONIN: <0.01 NG/ML (ref 0–0.03)
WBC # BLD: 8 K/UL (ref 4.8–10.8)

## 2021-08-05 PROCEDURE — 85025 COMPLETE CBC W/AUTO DIFF WBC: CPT

## 2021-08-05 PROCEDURE — 80053 COMPREHEN METABOLIC PANEL: CPT

## 2021-08-05 PROCEDURE — 71046 X-RAY EXAM CHEST 2 VIEWS: CPT

## 2021-08-05 PROCEDURE — 6370000000 HC RX 637 (ALT 250 FOR IP): Performed by: STUDENT IN AN ORGANIZED HEALTH CARE EDUCATION/TRAINING PROGRAM

## 2021-08-05 PROCEDURE — 87635 SARS-COV-2 COVID-19 AMP PRB: CPT

## 2021-08-05 PROCEDURE — G0378 HOSPITAL OBSERVATION PER HR: HCPCS

## 2021-08-05 PROCEDURE — 99283 EMERGENCY DEPT VISIT LOW MDM: CPT

## 2021-08-05 PROCEDURE — 36415 COLL VENOUS BLD VENIPUNCTURE: CPT

## 2021-08-05 PROCEDURE — 83880 ASSAY OF NATRIURETIC PEPTIDE: CPT

## 2021-08-05 PROCEDURE — 93005 ELECTROCARDIOGRAM TRACING: CPT | Performed by: EMERGENCY MEDICINE

## 2021-08-05 PROCEDURE — 84484 ASSAY OF TROPONIN QUANT: CPT

## 2021-08-05 PROCEDURE — 82947 ASSAY GLUCOSE BLOOD QUANT: CPT

## 2021-08-05 PROCEDURE — 93306 TTE W/DOPPLER COMPLETE: CPT

## 2021-08-05 RX ORDER — POTASSIUM CHLORIDE 7.45 MG/ML
10 INJECTION INTRAVENOUS PRN
Status: DISCONTINUED | OUTPATIENT
Start: 2021-08-05 | End: 2021-08-06 | Stop reason: HOSPADM

## 2021-08-05 RX ORDER — DEXTROSE MONOHYDRATE 25 G/50ML
12.5 INJECTION, SOLUTION INTRAVENOUS PRN
Status: DISCONTINUED | OUTPATIENT
Start: 2021-08-05 | End: 2021-08-06 | Stop reason: HOSPADM

## 2021-08-05 RX ORDER — DULOXETIN HYDROCHLORIDE 60 MG/1
60 CAPSULE, DELAYED RELEASE ORAL DAILY
Status: DISCONTINUED | OUTPATIENT
Start: 2021-08-05 | End: 2021-08-06 | Stop reason: HOSPADM

## 2021-08-05 RX ORDER — PROMETHAZINE HYDROCHLORIDE 25 MG/1
12.5 TABLET ORAL EVERY 6 HOURS PRN
Status: DISCONTINUED | OUTPATIENT
Start: 2021-08-05 | End: 2021-08-06 | Stop reason: HOSPADM

## 2021-08-05 RX ORDER — ACETAMINOPHEN 650 MG/1
650 SUPPOSITORY RECTAL EVERY 6 HOURS PRN
Status: DISCONTINUED | OUTPATIENT
Start: 2021-08-05 | End: 2021-08-06 | Stop reason: HOSPADM

## 2021-08-05 RX ORDER — POLYETHYLENE GLYCOL 3350 17 G/17G
17 POWDER, FOR SOLUTION ORAL DAILY PRN
Status: DISCONTINUED | OUTPATIENT
Start: 2021-08-05 | End: 2021-08-06 | Stop reason: HOSPADM

## 2021-08-05 RX ORDER — SODIUM CHLORIDE 0.9 % (FLUSH) 0.9 %
5-40 SYRINGE (ML) INJECTION EVERY 12 HOURS SCHEDULED
Status: DISCONTINUED | OUTPATIENT
Start: 2021-08-05 | End: 2021-08-06 | Stop reason: HOSPADM

## 2021-08-05 RX ORDER — DEXTROSE MONOHYDRATE 50 MG/ML
100 INJECTION, SOLUTION INTRAVENOUS PRN
Status: DISCONTINUED | OUTPATIENT
Start: 2021-08-05 | End: 2021-08-06 | Stop reason: HOSPADM

## 2021-08-05 RX ORDER — POTASSIUM CHLORIDE 20 MEQ/1
40 TABLET, EXTENDED RELEASE ORAL PRN
Status: DISCONTINUED | OUTPATIENT
Start: 2021-08-05 | End: 2021-08-06 | Stop reason: HOSPADM

## 2021-08-05 RX ORDER — CYCLOBENZAPRINE HCL 5 MG
5 TABLET ORAL 3 TIMES DAILY PRN
Status: DISCONTINUED | OUTPATIENT
Start: 2021-08-05 | End: 2021-08-06 | Stop reason: HOSPADM

## 2021-08-05 RX ORDER — MAGNESIUM SULFATE IN WATER 40 MG/ML
2000 INJECTION, SOLUTION INTRAVENOUS PRN
Status: DISCONTINUED | OUTPATIENT
Start: 2021-08-05 | End: 2021-08-06 | Stop reason: HOSPADM

## 2021-08-05 RX ORDER — ATORVASTATIN CALCIUM 10 MG/1
10 TABLET, FILM COATED ORAL DAILY
Status: DISCONTINUED | OUTPATIENT
Start: 2021-08-05 | End: 2021-08-06 | Stop reason: HOSPADM

## 2021-08-05 RX ORDER — NITROGLYCERIN 0.4 MG/1
0.4 TABLET SUBLINGUAL EVERY 5 MIN PRN
Status: DISCONTINUED | OUTPATIENT
Start: 2021-08-05 | End: 2021-08-06 | Stop reason: HOSPADM

## 2021-08-05 RX ORDER — ALPRAZOLAM 0.25 MG/1
0.25 TABLET ORAL NIGHTLY PRN
Status: DISCONTINUED | OUTPATIENT
Start: 2021-08-05 | End: 2021-08-06 | Stop reason: HOSPADM

## 2021-08-05 RX ORDER — SODIUM CHLORIDE 0.9 % (FLUSH) 0.9 %
5-40 SYRINGE (ML) INJECTION PRN
Status: DISCONTINUED | OUTPATIENT
Start: 2021-08-05 | End: 2021-08-06 | Stop reason: HOSPADM

## 2021-08-05 RX ORDER — NICOTINE POLACRILEX 4 MG
15 LOZENGE BUCCAL PRN
Status: DISCONTINUED | OUTPATIENT
Start: 2021-08-05 | End: 2021-08-06 | Stop reason: HOSPADM

## 2021-08-05 RX ORDER — ONDANSETRON 2 MG/ML
4 INJECTION INTRAMUSCULAR; INTRAVENOUS EVERY 6 HOURS PRN
Status: DISCONTINUED | OUTPATIENT
Start: 2021-08-05 | End: 2021-08-06 | Stop reason: HOSPADM

## 2021-08-05 RX ORDER — ACETAMINOPHEN 325 MG/1
650 TABLET ORAL EVERY 6 HOURS PRN
Status: DISCONTINUED | OUTPATIENT
Start: 2021-08-05 | End: 2021-08-06 | Stop reason: HOSPADM

## 2021-08-05 RX ORDER — LEVOTHYROXINE SODIUM 0.05 MG/1
50 TABLET ORAL DAILY
Status: DISCONTINUED | OUTPATIENT
Start: 2021-08-05 | End: 2021-08-06 | Stop reason: HOSPADM

## 2021-08-05 RX ORDER — MELATONIN 10 MG
10 CAPSULE ORAL NIGHTLY
Status: DISCONTINUED | OUTPATIENT
Start: 2021-08-05 | End: 2021-08-06 | Stop reason: HOSPADM

## 2021-08-05 RX ORDER — SODIUM CHLORIDE 9 MG/ML
25 INJECTION, SOLUTION INTRAVENOUS PRN
Status: DISCONTINUED | OUTPATIENT
Start: 2021-08-05 | End: 2021-08-06 | Stop reason: HOSPADM

## 2021-08-05 RX ORDER — UBROGEPANT 50 MG/1
TABLET ORAL
COMMUNITY

## 2021-08-05 RX ORDER — MORPHINE SULFATE 4 MG/ML
1 INJECTION, SOLUTION INTRAMUSCULAR; INTRAVENOUS EVERY 4 HOURS PRN
Status: DISCONTINUED | OUTPATIENT
Start: 2021-08-05 | End: 2021-08-06 | Stop reason: HOSPADM

## 2021-08-05 RX ADMIN — ATORVASTATIN CALCIUM 10 MG: 10 TABLET, FILM COATED ORAL at 21:09

## 2021-08-05 RX ADMIN — Medication 10 MG: at 21:10

## 2021-08-05 ASSESSMENT — ENCOUNTER SYMPTOMS
COUGH: 0
RHINORRHEA: 0
ABDOMINAL PAIN: 0
VOMITING: 0
VOICE CHANGE: 0
EYE REDNESS: 0
EYE PAIN: 0
SHORTNESS OF BREATH: 0
DIARRHEA: 0

## 2021-08-05 ASSESSMENT — PAIN DESCRIPTION - DESCRIPTORS: DESCRIPTORS: PRESSURE

## 2021-08-05 ASSESSMENT — PAIN SCALES - GENERAL: PAINLEVEL_OUTOF10: 2

## 2021-08-05 ASSESSMENT — PAIN DESCRIPTION - LOCATION: LOCATION: CHEST

## 2021-08-05 NOTE — H&P
Hospitalist: History and Physical    Date: 2021 Time: 8:37 AM    Name: Lou Hewitt : 1970 MRN: [id number]    Code Status: No Order No additional code details    PCP: Omar Rivas DO    Patient's Chief Complaint Is:     Chest pain    HPI: Patient is a 46 y.o. female with diabetes, hyperlipidemia, hypothyroidism, anxiety, presented to [address] [place] [address] ED with chest pain occurring intermittently over the past 3 days described as pressure, at times associated with exertion, but also occurring at rest at other times. No significant shortness of breath, nausea, vomiting, abdominal pain or diaphoresis. In addition to her comorbidities, also noted significant family history of cardiac disease. On initial work-up, EKG showed some nonspecific findings, otherwise no acute ischemic changes. Initial troponin negative. Chest x-ray without any acute findings. She had a stress test and echo several years ago for cardiac clearance prior to a procedure, which were unremarkable.           Past Medical History:   Diagnosis Date    Anxiety     Fibromyalgia     Hyperlipidemia     Thyroid disease     Type II or unspecified type diabetes mellitus without mention of complication, not stated as uncontrolled      Past Surgical History:   Procedure Laterality Date    BREAST BIOPSY Right 2012    stereotactic, fat necrosis and foci of fibrosis    CHOLECYSTECTOMY      COLONOSCOPY N/A 2020    Dr Waleska Andrade prep, 3 yr recall    DILATION AND CURETTAGE OF UTERUS      LAP BAND  2009    OTHER SURGICAL HISTORY      uterine ablation     Family History   Problem Relation Age of Onset    Heart Disease Father     Colon Cancer Neg Hx     Colon Polyps Neg Hx     Esophageal Cancer Neg Hx     Liver Cancer Neg Hx     Liver Disease Neg Hx     Rectal Cancer Neg Hx     Stomach Cancer Neg Hx      Social History     Socioeconomic History    Marital status:      Spouse name: Not on file    Number of children: Not on file    Years of education: Not on file    Highest education level: Not on file   Occupational History    Not on file   Tobacco Use    Smoking status: Never Smoker    Smokeless tobacco: Never Used   Vaping Use    Vaping Use: Never used   Substance and Sexual Activity    Alcohol use: Yes     Comment: occ    Drug use: No    Sexual activity: Not on file   Other Topics Concern    Not on file   Social History Narrative    Not on file     Social Determinants of Health     Financial Resource Strain:     Difficulty of Paying Living Expenses:    Food Insecurity:     Worried About Running Out of Food in the Last Year:     [address] in the Last Year:    Transportation Needs:     Lack of Transportation (Medical):  Lack of Transportation (Non-Medical):    Physical Activity:     Days of Exercise per Week:     Minutes of Exercise per Session:    Stress:     Feeling of Stress :    Social Connections:     Frequency of Communication with Friends and Family:     Frequency of Social Gatherings with Friends and Family:     Attends Yarsani Services:     Active Member of Clubs or Organizations:     Attends Club or Organization Meetings:     Marital Status:    Intimate Partner Violence:     Fear of Current or Ex-Partner:     Emotionally Abused:     Physically Abused:     Sexually Abused: Allergies   Allergen Reactions    Diflucan [Fluconazole] Hives     Prior to Admission medications    Medication Sig Start Date End Date Taking?  Authorizing Provider   Ubrogepant (UBRELVY) 50 MG TABS Take by mouth   Yes Historical Provider, MD   LINZESS 145 MCG capsule Take 1 capsule by mouth every morning (before breakfast) 4/23/21  Yes NAYELY Kee NP   levothyroxine (SYNTHROID) 50 MCG tablet Take 50 mcg by mouth Daily   Yes Historical Provider, MD   atorvastatin (LIPITOR) 10 MG tablet Take 10 mg by mouth daily   Yes Historical Provider, MD   empagliflozin (JARDIANCE) 25 MG tablet Take 25 mg by mouth daily   Yes Historical Provider, MD   Cholecalciferol (VITAMIN D PO) Take 1.25 mg by mouth once a week    Yes Historical Provider, MD   Eszopiclone (LUNESTA PO) Take by mouth nightly    Yes Historical Provider, MD   duloxetine (CYMBALTA) 60 MG capsule Take 60 mg by mouth daily. Yes Historical Provider, MD   cyclobenzaprine (FLEXERIL) 10 MG tablet Take 10 mg by mouth 3 times daily as needed. Yes Historical Provider, MD   metformin (GLUCOPHAGE) 1000 MG tablet Take 1,000 mg by mouth 2 times daily (with meals). Yes Historical Provider, MD   alprazolam January Heys) 0.5 MG tablet Take 0.5 mg by mouth nightly as needed. Yes Historical Provider, MD LOONEY have reviewed all pertinent history. Prior medical records and laboratory evaluation reviewed. Imaging independently reviewed. Review of Systems:   As per HPI, otherwise all other ROS performed and found to be negative at this time. Physical Exam:  Vitals:    08/05/21 0736   BP: 127/79   Pulse: 81   Resp: 17   Temp: 98 °F (36.7 °C)   SpO2: 98%     CONSTITUTIONAL: Appears well developed and nourished, well groomed, in no apparent distress. PSYCH: Judgement and insight are normal. Mental status alert and oriented to person, place and time. Mood and affect are normal  EYES: Symmetrical, no scleral icterus  ENT: No deformities noted  NECK: Trachea is midline. Nontender  Head: Normocephalic, atraumatic  LUNGS: Normal respiratory effort, no intercostal retractions or accessory muscle use. Clear to auscultation bilaterally with no crackles, wheezes or rales  CARDIOVASCULAR: Regular rate and rhythm,  No JVD. Pulses are equal bilaterally. GI/ABDOMEN: Soft, non-tender, non-distended, no guarding or rebound. Bowel sounds are normal.   SKIN: Warm and dry.   No rashes  NEUROLOGICAL: No focal lateralizing weakness, speech fluent  EXTREMITIES: No clubbing or cyanosis    Labs:   Recent Results (from the past 72 hour(s))   CBC Auto Differential Collection Time: 08/05/21  7:37 AM   Result Value Ref Range    WBC 8.0 4.8 - 10.8 K/uL    RBC 5.44 (H) 4.20 - 5.40 M/uL    Hemoglobin 15.9 12.0 - 16.0 g/dL    Hematocrit 47.5 (H) 37.0 - 47.0 %    MCV 87.3 81.0 - 99.0 fL    MCH 29.2 27.0 - 31.0 pg    MCHC 33.5 33.0 - 37.0 g/dL    RDW 13.6 11.5 - 14.5 %    Platelets 126 949 - 062 K/uL    MPV 9.6 9.4 - 12.3 fL    Neutrophils % 58.1 50.0 - 65.0 %    Lymphocytes % 26.6 20.0 - 40.0 %    Monocytes % 5.9 0.0 - 10.0 %    Eosinophils % 7.7 (H) 0.0 - 5.0 %    Basophils % 1.4 (H) 0.0 - 1.0 %    Neutrophils Absolute 4.6 1.5 - 7.5 K/uL    Immature Granulocytes # 0.0 K/uL    Lymphocytes Absolute 2.1 1.1 - 4.5 K/uL    Monocytes Absolute 0.50 0.00 - 0.90 K/uL    Eosinophils Absolute 0.60 0.00 - 0.60 K/uL    Basophils Absolute 0.10 0.00 - 0.20 K/uL   Comprehensive Metabolic Panel w/ Reflex to MG    Collection Time: 08/05/21  7:37 AM   Result Value Ref Range    Sodium 136 136 - 145 mmol/L    Potassium reflex Magnesium 4.1 3.5 - 5.0 mmol/L    Chloride 101 98 - 111 mmol/L    CO2 24 22 - 29 mmol/L    Anion Gap 11 7 - 19 mmol/L    Glucose 257 (H) 74 - 109 mg/dL    BUN 15 6 - 20 mg/dL    CREATININE 0.8 0.5 - 0.9 mg/dL    GFR Non-African American >60 >60    GFR African American >59 >59    Calcium 10.0 8.6 - 10.0 mg/dL    Total Protein 7.3 6.6 - 8.7 g/dL    Albumin 4.5 3.5 - 5.2 g/dL    Total Bilirubin 0.5 0.2 - 1.2 mg/dL    Alkaline Phosphatase 124 (H) 35 - 104 U/L    ALT 20 5 - 33 U/L    AST 15 5 - 32 U/L   Troponin    Collection Time: 08/05/21  7:37 AM   Result Value Ref Range    Troponin <0.01 0.00 - 0.03 ng/mL   Brain Natriuretic Peptide    Collection Time: 08/05/21  7:37 AM   Result Value Ref Range    Pro-BNP 37 0 - 900 pg/mL       Imaging: XR CHEST (2 VW)    Result Date: 8/5/2021  Examination. XR CHEST (2 VW) 8/5/2021 7:59 AM History: Chest pain. The frontal and lateral views of the chest are compared with the previous study dated 10/31/2015.  The lungs are poorly expanded with crowding of the bronchovascular structures, scarring and discoid atelectasis. There is no pleural effusion, pulmonary congestion or pneumothorax. There is mild elevation right diaphragm. Heart size in the normal range. There is no acute bony abnormality. No active cardiopulmonary disease. Signed by Dr Catrina Medina      Assessment/Plan:     Principal Problem:    Chest pain  Active Problems:    Fibromyalgia    Obesity    Diabetes mellitus ([address]    Hyperlipidemia  Resolved Problems:    * No resolved hospital problems.  *       Chest pain with cardiac risk factors  Monitor telemetry  Trend troponin  Serial EKGs as warranted  Obtain echocardiogram  Nitro as needed for angina    N.p.o. after midnight for stress test if serial troponins remain negative      Diabetes mellitus  Hold home oral meds  Serial glucose monitoring with sliding scale insulin correction as indicated    Hyperlipidemia  Continue home statin    Reconcile and continue appropriate home regimen for other chronic issues    DVT prophylaxis- lovenox      Sherly Cardenas MD  8/5/2021 8:37 AM

## 2021-08-05 NOTE — ED PROVIDER NOTES
[facility] EMERGENCY DEPT  EMERGENCY DEPARTMENT ENCOUNTER      Pt Name: Radha Ely  MRN: [id number]  [place] 1970  Date of evaluation: 8/5/2021  Provider: Melida Brandt MD    [address] [place] [address]       Chief Complaint   Patient presents with    Chest Pain     upper mid chest since Tuesday, worse with lifting         HISTORY OF PRESENT ILLNESS   (Location/Symptom, Timing/Onset,Context/Setting, Quality, Duration, Modifying Factors, Severity)  Note limiting factors. Radha Ely is a 46 y.o. female who presents to the emergency department with complaint of a pressure-like chest pain that has been present off and on over the last 3 days. Denies any specific inciting or proceeding events. Has had occasional episodes where she is broken out in a sweat and some occasional nausea associated with the pain. Denies any shortness of breath or leg swelling. No cough or fever. Patient has no personal history of cardiac disease but does have significant family history. Has a history of type 2 diabetes. No known history of hypertension hyperlipidemia, no smoking history. Does have history of obesity. States she has lost over 70 pounds with keto diet over the last 3 years. Pain has been mostly sporadic over the last 3 days and has been present more than not. States it did seem to worsen with laying flat and emergency department. 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 Otherwise has not been able to identify any specific movements that reproduce the pain. Contacted her primary care provider this morning who advised her to come to the hospital for further work-up and mentioned it can be after 2 weeks before and outpatient stress test could be done    HPI    NursingNotes were reviewed. REVIEW OF SYSTEMS    (2-9 systems for level 4, 10 or more for level 5)     Review of Systems   Constitutional: Positive for fatigue.  Negative for fever. HENT: Negative for congestion, rhinorrhea and voice change. Eyes: Negative for pain and redness. Respiratory: Negative for cough and shortness of breath. Cardiovascular: Positive for chest pain. Gastrointestinal: Negative for abdominal pain, diarrhea and vomiting. Endocrine: Negative. Genitourinary: Negative. Musculoskeletal: Negative for arthralgias and gait problem. Skin: Negative for rash and wound. Neurological: Negative for weakness and headaches. Hematological: Negative. Psychiatric/Behavioral: Negative. All other systems reviewed and are negative. A complete review of systems was performed and is negative except as noted above in the HPI. PAST MEDICAL HISTORY     Past Medical History:   Diagnosis Date    Anxiety     Fibromyalgia     Hyperlipidemia     Thyroid disease     Type II or unspecified type diabetes mellitus without mention of complication, not stated as uncontrolled          SURGICAL HISTORY       Past Surgical History:   Procedure Laterality Date    BREAST BIOPSY Right 04/19/2012    stereotactic, fat necrosis and foci of fibrosis    CHOLECYSTECTOMY      COLONOSCOPY N/A 8/4/2020    Dr Chloe Jaeger prep, 3 yr recall    DILATION AND CURETTAGE OF UTERUS      LAP BAND  2009    OTHER SURGICAL HISTORY      uterine ablation         CURRENT MEDICATIONS       Previous Medications    ALPRAZOLAM (XANAX) 0.5 MG TABLET    Take 0.5 mg by mouth nightly as needed. ATORVASTATIN (LIPITOR) 10 MG TABLET    Take 10 mg by mouth daily    CHOLECALCIFEROL (VITAMIN D PO)    Take 1.25 mg by mouth once a week     CYCLOBENZAPRINE (FLEXERIL) 10 MG TABLET    Take 10 mg by mouth 3 times daily as needed. DULOXETINE (CYMBALTA) 60 MG CAPSULE    Take 60 mg by mouth daily.       EMPAGLIFLOZIN (JARDIANCE) 25 MG TABLET    Take 25 mg by mouth daily    ESZOPICLONE (LUNESTA PO)    Take by mouth nightly     LEVOTHYROXINE (SYNTHROID) 50 MCG TABLET    Take 50 mcg by mouth Daily    LINZESS 145 MCG CAPSULE    Take 1 capsule by mouth every morning (before breakfast)    METFORMIN (GLUCOPHAGE) 1000 MG TABLET    Take 1,000 mg by mouth 2 times daily (with meals). UBROGEPANT (UBRELVY) 50 MG TABS    Take by mouth       ALLERGIES     Diflucan [fluconazole]    FAMILY HISTORY       Family History   Problem Relation Age of Onset    Heart Disease Father     Colon Cancer Neg Hx     Colon Polyps Neg Hx     Esophageal Cancer Neg Hx     Liver Cancer Neg Hx     Liver Disease Neg Hx     Rectal Cancer Neg Hx     Stomach Cancer Neg Hx           SOCIAL HISTORY       Social History     Socioeconomic History    Marital status:      Spouse name: None    Number of children: None    Years of education: None    Highest education level: None   Occupational History    None   Tobacco Use    Smoking status: Never Smoker    Smokeless tobacco: Never Used   Vaping Use    Vaping Use: Never used   Substance and Sexual Activity    Alcohol use: Yes     Comment: occ    Drug use: No    Sexual activity: None   Other Topics Concern    None   Social History Narrative    None     Social Determinants of Health     Financial Resource Strain:     Difficulty of Paying Living Expenses:    Food Insecurity:     Worried About Running Out of Food in the Last Year:     Ran Out of Food in the Last Year:    Transportation Needs:     Lack of Transportation (Medical):      Lack of Transportation (Non-Medical):    Physical Activity:     Days of Exercise per Week:     Minutes of Exercise per Session:    Stress:     Feeling of Stress :    Social Connections:     Frequency of Communication with Friends and Family:     Frequency of Social Gatherings with Friends and Family:     Attends Adventism Services:     Active Member of Clubs or Organizations:     Attends Club or Organization Meetings:     Marital Status:    Intimate Partner Violence:     Fear of Current or Ex-Partner:     Behavior: Behavior normal.         Judgment: Judgment normal.         DIAGNOSTIC RESULTS     EKG: All EKG's are interpreted by the Emergency Department Physician who either signs or Co-signs this chart in the absence of a cardiologist.      RADIOLOGY:   Non-plain film images such as CT, Ultrasound and MRI are read by the radiologist. Eliverto Ode images are visualized and preliminarily interpreted by the emergency physician with the below findings:        Interpretation per the Radiologist below, if available at the time of this note:    XR CHEST (2 VW)   Final Result   No active cardiopulmonary disease. Signed by Dr Valiente Shock            ED BEDSIDE ULTRASOUND:   Performed by ED Physician - none    LABS:  Labs Reviewed   CBC WITH AUTO DIFFERENTIAL - Abnormal; Notable for the following components:       Result Value    RBC 5.44 (*)     Hematocrit 47.5 (*)     Eosinophils % 7.7 (*)     Basophils % 1.4 (*)     All other components within normal limits   COMPREHENSIVE METABOLIC PANEL W/ REFLEX TO MG FOR LOW K - Abnormal; Notable for the following components:    Glucose 257 (*)     Alkaline Phosphatase 124 (*)     All other components within normal limits   COVID-19, RAPID   TROPONIN   BRAIN NATRIURETIC PEPTIDE       All other labs were within normal range or not returned as of this dictation. Medications - No data to display    [address] [place] [address] and DIFFERENTIALDIAGNOSIS/MDM:   Vitals:    Vitals:    08/05/21 0736   BP: 127/79   Pulse: 81   Resp: 17   Temp: 98 °F (36.7 °C)   TempSrc: Oral   SpO2: 98%   Weight: 195 lb (88.5 kg)   Height: 5' 4\" (1.626 m)       [facility]      ED Course as of Aug 05 0832   Thu Aug 05, 2021   0826 EKG shows sinus rhythm with a rate of 79. There are some inferior and anterior Q waves present. No findings of acute ischemia or infarction or right heart strain.   Heart score 5    [JOE]      ED Course User Index  [JOE] Mian Enciso MD     Based on the evaluation and work-up here patient is felt to require further monitoring, work-up, or treatment that is available in the emergency department. Case was discussed with hospitalist who agrees for observation or admission for further management. Treatment and stabilization as necessary were provided in the emergency department prior to transfer of care to the medicine service. CONSULTS:  None    PROCEDURES:  Unless otherwise notedbelow, none     Procedures      FINAL IMPRESSION     1. Chest pain, unspecified type          DISPOSITION/PLAN   DISPOSITION Decision To Admit 08/05/2021 08:26:52 AM      PATIENT REFERRED TO:  No follow-up provider specified.     DISCHARGE MEDICATIONS:  New Prescriptions    No medications on file          (Please note that portions of this note were completed with a voice recognition program.  Efforts were made to edit the dictations butoccasionally words are mis-transcribed.)    Blanche Woodard MD (electronically signed)  AttendingEmergency Physician          Blanche Dillard MD  08/05/21 4921

## 2021-08-06 ENCOUNTER — APPOINTMENT (OUTPATIENT)
Dept: NUCLEAR MEDICINE | Age: 51
End: 2021-08-06
Payer: COMMERCIAL

## 2021-08-06 VITALS
SYSTOLIC BLOOD PRESSURE: 133 MMHG | DIASTOLIC BLOOD PRESSURE: 89 MMHG | WEIGHT: 195 LBS | HEART RATE: 83 BPM | RESPIRATION RATE: 20 BRPM | OXYGEN SATURATION: 99 % | TEMPERATURE: 97.7 F | BODY MASS INDEX: 33.29 KG/M2 | HEIGHT: 64 IN

## 2021-08-06 LAB
EKG P AXIS: 57 DEGREES
EKG P-R INTERVAL: 166 MS
EKG Q-T INTERVAL: 370 MS
EKG QRS DURATION: 64 MS
EKG QTC CALCULATION (BAZETT): 401 MS
EKG T AXIS: 38 DEGREES
GLUCOSE BLD-MCNC: 137 MG/DL (ref 70–99)
GLUCOSE BLD-MCNC: 165 MG/DL (ref 70–99)
LV EF: 70 %
LVEF MODALITY: NORMAL
PERFORMED ON: ABNORMAL
PERFORMED ON: ABNORMAL

## 2021-08-06 PROCEDURE — 96372 THER/PROPH/DIAG INJ SC/IM: CPT

## 2021-08-06 PROCEDURE — 6360000002 HC RX W HCPCS: Performed by: STUDENT IN AN ORGANIZED HEALTH CARE EDUCATION/TRAINING PROGRAM

## 2021-08-06 PROCEDURE — 96374 THER/PROPH/DIAG INJ IV PUSH: CPT

## 2021-08-06 PROCEDURE — 93017 CV STRESS TEST TRACING ONLY: CPT

## 2021-08-06 PROCEDURE — G0378 HOSPITAL OBSERVATION PER HR: HCPCS

## 2021-08-06 PROCEDURE — 82947 ASSAY GLUCOSE BLOOD QUANT: CPT

## 2021-08-06 PROCEDURE — 6370000000 HC RX 637 (ALT 250 FOR IP): Performed by: STUDENT IN AN ORGANIZED HEALTH CARE EDUCATION/TRAINING PROGRAM

## 2021-08-06 PROCEDURE — 2580000003 HC RX 258: Performed by: STUDENT IN AN ORGANIZED HEALTH CARE EDUCATION/TRAINING PROGRAM

## 2021-08-06 PROCEDURE — A9500 TC99M SESTAMIBI: HCPCS | Performed by: STUDENT IN AN ORGANIZED HEALTH CARE EDUCATION/TRAINING PROGRAM

## 2021-08-06 PROCEDURE — 3430000000 HC RX DIAGNOSTIC RADIOPHARMACEUTICAL: Performed by: STUDENT IN AN ORGANIZED HEALTH CARE EDUCATION/TRAINING PROGRAM

## 2021-08-06 RX ADMIN — ENOXAPARIN SODIUM 40 MG: 40 INJECTION SUBCUTANEOUS at 09:34

## 2021-08-06 RX ADMIN — DULOXETINE 60 MG: 60 CAPSULE, DELAYED RELEASE ORAL at 09:34

## 2021-08-06 RX ADMIN — TETRAKIS(2-METHOXYISOBUTYLISOCYANIDE)COPPER(I) TETRAFLUOROBORATE 30 MILLICURIE: 1 INJECTION, POWDER, LYOPHILIZED, FOR SOLUTION INTRAVENOUS at 09:26

## 2021-08-06 RX ADMIN — ONDANSETRON HYDROCHLORIDE 4 MG: 2 SOLUTION INTRAMUSCULAR; INTRAVENOUS at 08:51

## 2021-08-06 RX ADMIN — REGADENOSON 0.4 MG: 0.08 INJECTION, SOLUTION INTRAVENOUS at 08:52

## 2021-08-06 RX ADMIN — ATORVASTATIN CALCIUM 10 MG: 10 TABLET, FILM COATED ORAL at 09:34

## 2021-08-06 RX ADMIN — SODIUM CHLORIDE, PRESERVATIVE FREE 10 ML: 5 INJECTION INTRAVENOUS at 09:35

## 2021-08-06 RX ADMIN — TETRAKIS(2-METHOXYISOBUTYLISOCYANIDE)COPPER(I) TETRAFLUOROBORATE 10 MILLICURIE: 1 INJECTION, POWDER, LYOPHILIZED, FOR SOLUTION INTRAVENOUS at 09:26

## 2021-08-06 NOTE — CARE COORDINATION
CM Initial Assessment   Met with pt, spouse at bedside, to assess dc plans/needs. The following information has been reviewed and confirmed: Address:  [address] [place] [address]    Phone number:   [phone] (home)     Pt reports that she lives at home with her spouse and 2 dogs. She is independent at baseline, drives, and plans to return back home at dc. She reports her medications as affordable. She denies any further needs at this time and plans or her spouse to transport her home at dc.    Electronically signed by Dixon Valencia on 8/6/2021 at 10:27 AM

## 2021-08-06 NOTE — DISCHARGE SUMMARY
Discharge Summary    NAME: Fifi Danielle  :  1970  MRN:  [id number]    Admit date:  2021  Discharge date:  2021    Admitting Physician:  Stephanie Mata MD    Advance Directive: Full Code    Consults: none    Primary Care Physician:  Araseli Vásquez DO    Discharge Diagnoses:      Chest pain    Fibromyalgia    Obesity    Diabetes mellitus ([address]    Hyperlipidemia      Significant Diagnostic Studies:   ECHO Complete 2D W Doppler W Color    Result Date: 2021  Transthoracic Echocardiography Report (TTE)  Demographics   Patient Name   Kenrick Buck  Date of Study           2021   MRN            [id number]            Gender                  Female   Date of Birth  1970        Room Number             [facility]-0408   Age            46 year(s)   Height:        64 inches         Referring Physician     Stephanie Mata   Weight:        195 pounds        Sonographer             Berna Rodriguez [facility]   BSA:           1.94 m^2          Interpreting Physician  Damari Morris   BMI:           33.47 kg/m^2  Procedure Type of Study   TTE procedure:ECHO NO CONTRAST WITH DOP/COLR. Study Location: Echo Lab Technical Quality: Adequate visualization Patient Status: Inpatient Indications:Chest pain. Conclusions   Summary  Normal left ventricular size with preserved LV function and a calculated  ejection fraction of 74%. No regional wall motion abnormalities. Borderline LV concentric hypertrophy. Normal diastolic function. Normal size left atrium. No evidence for PFO/ASD by color flow Doppler only. Normal right ventricular size with grossly preserved RV function. Normal right atrial dimension. No clinically significant valve regurgitation or stenosis. Cannot estimate RVSP. Aortic root is within normal limits. The IVC is normal in size with decreased collapsibility suggestive for  increased RA pressure. No evidence of significant pericardial effusion is noted.    Signature ----------------------------------------------------------------  Electronically signed by Izabela HwangInterpreting physician)  on 08/05/2021 02:33 PM  ----------------------------------------------------------------   Findings   Mitral Valve  Mitral valve is mildly thickened with normal leaflet mobility. No evidence  of mitral valve stenosis or mitral regurgitation. Aortic Valve  Aortic valve appears to be tricuspid. Structurally normal aortic valve. No significant aortic regurgitation or stenosis is noted. Tricuspid Valve  Tricuspid valve is structurally normal.  No evidence of tricuspid regurgitation. Cannot estimate RVSP. Pulmonic Valve  The pulmonic valve was not well visualized. No pulmonic regurgitation. Left Atrium  Normal size left atrium. No evidence for PFO/ASD by color flow Doppler only. Left Ventricle  Normal left ventricular size with preserved LV function and a calculated  ejection fraction of 74%. No regional wall motion abnormalities. Borderline LV concentric hypertrophy. Normal diastolic filling pattern. Right Atrium  Normal right atrial dimension. Right Ventricle  Normal right ventricular size with grossly preserved RV function. Pericardial Effusion  No evidence of significant pericardial effusion is noted. Pleural Effusion  No evidence of pleural effusion. Miscellaneous  Aortic root is within normal limits. The IVC is normal in size with  decrease collapsibility suggestive for increased RA pressure (8 mmHg). M-Mode Measurements (cm)   LVIDd: 3.6 cm                          LVIDs: 2.08 cm  IVSd: 0.98 cm  LVPWd: 0.94 cm                         AO Root Dimension: 2.6 cm  % Ejection Fraction: 74.1 %            LA:  3.4 cm                                         LVOT: 2 cm  Doppler Measurements:   AV Peak Velocity:181 cm/s            MV Peak E-Wave: 78 cm/s  AV Peak Gradient: 13.1 mmHg          MV Peak A-Wave: 83.1 cm/s  AV Mean Gradient: 7 mmHg             MV E/A Ratio: changes. Initial troponin negative. Chest x-ray without any acute findings. She had a stress test and echo several years ago for cardiac clearance prior to a procedure, which were unremarkable. Following admission, no events on telemetry. Serial troponins negative. Underwent Lexiscan stress test which returned negative. Echocardiogram showed preserved EF, otherwise unremarkable. Patient stable for discharge home with instructions to follow-up with PCP within a week. Physical Exam:  Vital Signs: /87   Pulse 79   Temp 95.3 °F (35.2 °C) (Oral) Comment: RN notified  Resp 18   Ht 5' 4\" (1.626 m)   Wt 195 lb (88.5 kg)   LMP 10/31/2014   SpO2 95%   BMI 33.47 kg/m²   General appearance:. Alert and Cooperative   HEENT: Normocephalic. Atraumatic  Chest: clear to auscultation bilaterally without wheezes or rhonchi. Cardiac: Normal heart tones with regular rate and rhythm, S1, S2 normal. No murmurs, gallops, or rubs auscultated. Abdomen:soft, non-tender; normal bowel sounds  Extremities: No clubbing or cyanosis. No peripheral edema. Peripheral pulses palpable. Neurologic: Grossly intact. No deficits, speech fluent    Discharge Medications:       [facility] Medication Instructions AX    Printed on:21 1029   Medication Information                      alprazolam (XANAX) 0.5 MG tablet  Take 0.5 mg by mouth nightly as needed. atorvastatin (LIPITOR) 10 MG tablet  Take 10 mg by mouth daily             Cholecalciferol (VITAMIN D PO)  Take 1.25 mg by mouth once a week              cyclobenzaprine (FLEXERIL) 10 MG tablet  Take 10 mg by mouth 3 times daily as needed. duloxetine (CYMBALTA) 60 MG capsule  Take 60 mg by mouth daily.                empagliflozin (JARDIANCE) 25 MG tablet  Take 25 mg by mouth daily             Eszopiclone (LUNESTA PO)  Take by mouth nightly              levothyroxine (SYNTHROID) 50 MCG tablet  Take 50 mcg by mouth Daily             LINZESS 145 MCG capsule  Take 1 capsule by mouth every morning (before breakfast)             metformin (GLUCOPHAGE) 1000 MG tablet  Take 1,000 mg by mouth 2 times daily (with meals). Ubrogepant (UBRELVY) 50 MG TABS  Take by mouth                 Discharge Instructions: Follow up with Naga Francis DO in 3 days. Take medications as directed. Resume activity as tolerated. Disposition: Patient is medically stable and will be discharged home. Time spent on discharge 32 minutes.      Signed:  Jayna Ramos MD  8/6/2021 10:29 AM

## 2021-08-06 NOTE — PLAN OF CARE
Problem:  Activity Intolerance:  Goal: Ability to tolerate increased activity will improve  Description: Ability to tolerate increased activity will improve  Outcome: Ongoing     Problem: Anxiety/Stress:  Goal: Level of anxiety will decrease  Description: Level of anxiety will decrease  Outcome: Ongoing     Problem: Pain:  Goal: Pain level will decrease  Description: Pain level will decrease  Outcome: Ongoing  Goal: Ability to notify healthcare provider of pain before it becomes unmanageable or unbearable will improve  Description: Ability to notify healthcare provider of pain before it becomes unmanageable or unbearable will improve  Outcome: Ongoing  Goal: Control of acute pain  Description: Control of acute pain  Outcome: Ongoing  Goal: Control of chronic pain  Description: Control of chronic pain  Outcome: Ongoing

## 2021-08-06 NOTE — PLAN OF CARE
Problem:  Activity Intolerance:  Goal: Ability to tolerate increased activity will improve  Description: Ability to tolerate increased activity will improve  8/6/2021 1346 by Rafaela Chen RN  Outcome: Completed  8/6/2021 1007 by Rafaela Chen RN  Outcome: Ongoing  8/6/2021 0124 by Keren Kingsley RN  Outcome: Ongoing     Problem: Anxiety/Stress:  Goal: Level of anxiety will decrease  Description: Level of anxiety will decrease  8/6/2021 1346 by Rafaela Chen RN  Outcome: Completed  8/6/2021 1007 by Rafaela Chen RN  Outcome: Ongoing  8/6/2021 0124 by Keren Kingsley RN  Outcome: Ongoing     Problem: Pain:  Goal: Pain level will decrease  Description: Pain level will decrease  8/6/2021 1346 by Rafaela Chen RN  Outcome: Completed  8/6/2021 1007 by Rafaela Chen RN  Outcome: Ongoing  8/6/2021 0124 by Keren Kingsley RN  Outcome: Ongoing  Goal: Ability to notify healthcare provider of pain before it becomes unmanageable or unbearable will improve  Description: Ability to notify healthcare provider of pain before it becomes unmanageable or unbearable will improve  8/6/2021 1346 by Rafaela Chen RN  Outcome: Completed  8/6/2021 1007 by Rafaela Chen RN  Outcome: Ongoing  8/6/2021 0124 by Keren Kingsley RN  Outcome: Ongoing  Goal: Control of acute pain  Description: Control of acute pain  8/6/2021 1346 by Rafaela Chen RN  Outcome: Completed  8/6/2021 1007 by Rafaela Chen RN  Outcome: Ongoing  8/6/2021 0124 by Keren Kingsley RN  Outcome: Ongoing  Goal: Control of chronic pain  Description: Control of chronic pain  8/6/2021 1346 by Rafaela Chen RN  Outcome: Completed  8/6/2021 1007 by Rafaela Chen RN  Outcome: Ongoing  8/6/2021 0124 by Keren Kingsley RN  Outcome: Ongoing

## 2021-08-06 NOTE — PROGRESS NOTES
Discharge education completed at bedside. IV discontinued, no complications.  Patient refused transport, has  to assist. Electronically signed by Carmen Blanton RN on 8/6/2021 at 1:46 PM

## 2021-08-06 NOTE — PLAN OF CARE
Problem:  Activity Intolerance:  Goal: Ability to tolerate increased activity will improve  Description: Ability to tolerate increased activity will improve  8/6/2021 1007 by Nora Goldberg, RN  Outcome: Ongoing  8/6/2021 0124 by Susan Richardson RN  Outcome: Ongoing     Problem: Anxiety/Stress:  Goal: Level of anxiety will decrease  Description: Level of anxiety will decrease  8/6/2021 1007 by Nora Goldberg, RN  Outcome: Ongoing  8/6/2021 0124 by Susan Richardson RN  Outcome: Ongoing     Problem: Pain:  Goal: Pain level will decrease  Description: Pain level will decrease  8/6/2021 1007 by Nora Goldberg, RN  Outcome: Ongoing  8/6/2021 0124 by Susan Richardson RN  Outcome: Ongoing  Goal: Ability to notify healthcare provider of pain before it becomes unmanageable or unbearable will improve  Description: Ability to notify healthcare provider of pain before it becomes unmanageable or unbearable will improve  8/6/2021 1007 by Nora Goldberg, RN  Outcome: Ongoing  8/6/2021 0124 by Susan Richardson RN  Outcome: Ongoing  Goal: Control of acute pain  Description: Control of acute pain  8/6/2021 1007 by Nora Goldberg, RN  Outcome: Ongoing  8/6/2021 0124 by Susan Richardson RN  Outcome: Ongoing  Goal: Control of chronic pain  Description: Control of chronic pain  8/6/2021 1007 by Nora Goldberg, RN  Outcome: Ongoing  8/6/2021 0124 by Susan Richardson RN  Outcome: Ongoing

## 2021-08-09 ENCOUNTER — CARE COORDINATION (OUTPATIENT)
Dept: CASE MANAGEMENT | Age: 51
End: 2021-08-09

## 2021-08-09 NOTE — CARE COORDINATION
[address] Transitions Initial Follow Up Call    Call within 2 business days of discharge: Yes    Patient: Ariana Spence Patient : 1970   MRN: [id number]  Reason for Admission: Chest Pain  Discharge Date: 21 RARS: No data recorded    Last Discharge [facility]       Complaint Diagnosis Description Type Department Provider    21 Chest Pain Chest pain, unspecified type ED to Hosp-Admission (Discharged) (ADMITTED) [facility] Nakul Cruz MD; Zoya Vergara. .. Spoke with: Beth 38: [facility]    Non-face-to-face services provided:  Reviewed encounter information for continuity of care prior to follow up Care Transitions phone call - chart notes, consults, progress notes, test results, med list, appointments, AVS, other information. Care Transitions 24 Hour Call    Schedule Follow Up Appointment with PCP: Completed  Do you have a copy of your discharge instructions?: Yes  Do you have all of your prescriptions and are they filled?: Yes  Have you been contacted by a [employer] [address]?: No  Have you scheduled your follow up appointment?: Yes  How are you going to get to your appointment?: Car - drive self  Were you discharged with any Home Care or Post Acute Services: No  Do you feel like you have everything you need to keep you well at home?: Yes  Care Transitions Interventions       Placed a call to the number listed for patient and spoke with her for an initial follow up call for Care Transitions Coordination following discharge from the hospital on 2021. She reported that she is doing better. She said she still has a little bit of pressure in her chest, but they told her that they believe it to be muscular related more so than cardiac. She is to take Ibuprofen or Tylenol for it. She had no med changes. She is aware of her hospital follow up appointment with PCP this Friday. She is not aware of any needs. Said blood sugar is stable.   She does not feel that she needs any further follow up  But does have CTN contact and will call prn. Discussed COVID 19 information, risks, signs, quarantine, infection control, vaccines, etc.  Patient aware and voices understanding. Given the opportunity to ask questions and answered appropriately. Ensured to have CTN contact information to be used as needed. Encouraged to call for new issues, questions, concerns, etc.  No further scheduled calls needed at this time. Transitions of Care Initial Call    Was this an external facility discharge? No      Challenges to be reviewed by the provider   Additional needs identified to be addressed with provider: No       Method of communication with provider : none      Advance Care Planning:   Does patient have an Advance Directive: not on file. Was this a readmission? no  Patient stated reason for admission: chest pain  Patients top risk factors for readmission: medical condition-Chest pain, DM, FIbromyalgia and medication management    Care Transition Nurse (CTN) contacted the patient by telephone to perform post hospital discharge assessment. Verified name and  with patient as identifiers. Provided introduction to self, and explanation of the CTN role. CTN reviewed discharge instructions, medical action plan and red flags with patient who verbalized understanding. Patient given an opportunity to ask questions and does not have any further questions or concerns at this time. Were discharge instructions available to patient? Yes. Reviewed appropriate site of care based on symptoms and resources available to patient including: PCP, Urgent care clinics, When to call 911 and [address]. The patient agrees to contact the PCP office for questions related to their healthcare. Medication reconciliation was not performed with patient, declined at this time. She verbalizes understanding of administration of home medications.  Advised obtaining a 90-day supply of

## 2021-10-15 RX ORDER — LINACLOTIDE 145 UG/1
CAPSULE, GELATIN COATED ORAL
Qty: 30 CAPSULE | Refills: 2 | Status: SHIPPED | OUTPATIENT
Start: 2021-10-15

## 2021-10-15 NOTE — TELEPHONE ENCOUNTER
I will refill for now, will need follow up for further refills.   However, she can get this through PCP if desires

## 2024-01-19 ENCOUNTER — TRANSCRIBE ORDERS (OUTPATIENT)
Dept: ADMINISTRATIVE | Age: 54
End: 2024-01-19

## 2024-01-19 DIAGNOSIS — Z12.39 SCREENING BREAST EXAMINATION: Primary | ICD-10-CM

## 2024-01-26 ENCOUNTER — HOSPITAL ENCOUNTER (OUTPATIENT)
Dept: WOMENS IMAGING | Age: 54
Discharge: HOME | End: 2024-01-26
Payer: COMMERCIAL

## 2024-01-26 DIAGNOSIS — Z12.39 SCREENING BREAST EXAMINATION: ICD-10-CM

## 2024-01-26 PROCEDURE — 77063 BREAST TOMOSYNTHESIS BI: CPT

## (undated) DEVICE — ENDO KIT,LOURDES HOSPITAL: Brand: MEDLINE INDUSTRIES, INC.